# Patient Record
Sex: FEMALE | Race: WHITE | Employment: FULL TIME | URBAN - METROPOLITAN AREA
[De-identification: names, ages, dates, MRNs, and addresses within clinical notes are randomized per-mention and may not be internally consistent; named-entity substitution may affect disease eponyms.]

---

## 2017-09-11 ENCOUNTER — TELEPHONE (OUTPATIENT)
Dept: FAMILY MEDICINE CLINIC | Age: 32
End: 2017-09-11

## 2017-09-11 ENCOUNTER — OFFICE VISIT (OUTPATIENT)
Dept: FAMILY MEDICINE CLINIC | Age: 32
End: 2017-09-11

## 2017-09-11 VITALS
SYSTOLIC BLOOD PRESSURE: 128 MMHG | BODY MASS INDEX: 44.25 KG/M2 | TEMPERATURE: 97.8 F | HEIGHT: 68 IN | DIASTOLIC BLOOD PRESSURE: 88 MMHG | HEART RATE: 83 BPM | OXYGEN SATURATION: 98 % | RESPIRATION RATE: 17 BRPM | WEIGHT: 292 LBS

## 2017-09-11 DIAGNOSIS — F43.21 GRIEF: ICD-10-CM

## 2017-09-11 DIAGNOSIS — Z00.00 ROUTINE GENERAL MEDICAL EXAMINATION AT A HEALTH CARE FACILITY: ICD-10-CM

## 2017-09-11 DIAGNOSIS — F41.9 ANXIETY: ICD-10-CM

## 2017-09-11 DIAGNOSIS — F43.10 PTSD (POST-TRAUMATIC STRESS DISORDER): Primary | ICD-10-CM

## 2017-09-11 DIAGNOSIS — F51.04 PSYCHOPHYSIOLOGICAL INSOMNIA: ICD-10-CM

## 2017-09-11 RX ORDER — LORAZEPAM 0.5 MG/1
0.5 TABLET ORAL
Qty: 14 TAB | Refills: 0 | Status: SHIPPED | OUTPATIENT
Start: 2017-09-11 | End: 2019-10-25 | Stop reason: ALTCHOICE

## 2017-09-11 RX ORDER — TRAZODONE HYDROCHLORIDE 50 MG/1
50 TABLET ORAL
Qty: 30 TAB | Refills: 0 | Status: SHIPPED | OUTPATIENT
Start: 2017-09-11 | End: 2017-09-21 | Stop reason: SDUPTHER

## 2017-09-11 RX ORDER — ESCITALOPRAM OXALATE 10 MG/1
10 TABLET ORAL DAILY
Qty: 30 TAB | Refills: 0 | Status: SHIPPED | OUTPATIENT
Start: 2017-09-11 | End: 2019-10-25 | Stop reason: ALTCHOICE

## 2017-09-11 RX ORDER — LORAZEPAM 0.5 MG/1
0.5 TABLET ORAL
COMMUNITY
Start: 2017-09-09 | End: 2017-09-11 | Stop reason: SDUPTHER

## 2017-09-11 NOTE — MR AVS SNAPSHOT
Visit Information Date & Time Provider Department Dept. Phone Encounter #  
 9/11/2017  1:00 PM Marilyn Woodward, 3 [facility] [phone] [id number] Your Appointments 9/21/2017  2:30 PM  
HOSPITAL FOLLOW-UP with Marilyn Woodward MD  
3 [facility]) Appt Note: hosp f/u per mychart; pt r/s 09/21/17 rb  
 [address] [facility] [address] [facility] [address] [phone]  
  
   
 [address] Upcoming Health Maintenance Date Due DTaP/Tdap/Td series (1 - Tdap) 3/19/2008 INFLUENZA AGE 9 TO ADULT 8/1/2017 PAP AKA CERVICAL CYTOLOGY 1/1/2018 Allergies as of 9/11/2017  Review Complete On: 9/11/2017 By: Marilyn Woodward MD  
  
 Severity Noted Reaction Type Reactions Adhesive  11/25/2015    Rash Arixtra [Fondaparinux]  11/25/2015    Rash Other Medication  11/25/2015    Other (comments) Hormone type medications causes clots Sulfa (Sulfonamide Antibiotics)  11/25/2015    Rash Current Immunizations  Never Reviewed Name Date HPV (Quad) 5/11/2010 12:00 AM, 12/17/2009 12:00 AM, 10/15/2009 12:00 AM  
 Influenza Vaccine 9/22/2015 12:00 AM, 10/15/2012 12:00 AM, 11/3/2011 12:00 AM  
 Tdap 10/15/2009 12:00 AM  
  
 Not reviewed this visit You Were Diagnosed With   
  
 Codes Comments PTSD (post-traumatic stress disorder)    -  Primary ICD-10-CM: F43.10 ICD-9-CM: 309.81 Anxiety     ICD-10-CM: F41.9 ICD-9-CM: 300.00 Grief     ICD-10-CM: R10.11 ICD-9-CM: 309.0 Psychophysiological insomnia     ICD-10-CM: F51.04 
ICD-9-CM: 307.42 Routine general medical examination at a health care facility     ICD-10-CM: Z00.00 ICD-9-CM: V70.0 Vitals BP Pulse Temp Resp Height(growth percentile) Weight(growth percentile) 128/88 (BP 1 Location: Left arm, BP Patient Position: Sitting) 83 97.8 °F (36.6 °C) (Oral) 17 5' 8\" (1.727 m) 292 lb (132.5 kg) LMP SpO2 BMI OB Status Smoking Status 08/28/2017 98% 44.4 kg/m2 Having regular periods Never Smoker Vitals History BMI and BSA Data Body Mass Index Body Surface Area 44.4 kg/m 2 2.52 m 2 Preferred Pharmacy Pharmacy Name Phone [address], [address] Hector Amos Dural [phone] Your Updated Medication List  
  
   
This list is accurate as of: 9/11/17  1:10 PM.  Always use your most recent med list.  
  
  
  
  
 escitalopram oxalate 10 mg tablet Commonly known as:  Verneta Walter Take 1 Tab by mouth daily. LORazepam 0.5 mg tablet Commonly known as:  ATIVAN Take 1 Tab by mouth daily as needed. traZODone 50 mg tablet Commonly known as:  Donzella Eaton Take 1 Tab by mouth nightly as needed for Sleep. Prescriptions Printed Refills LORazepam (ATIVAN) 0.5 mg tablet 0 Sig: Take 1 Tab by mouth daily as needed. Class: Print Route: Oral  
  
Prescriptions Sent to Pharmacy Refills  
 traZODone (DESYREL) 50 mg tablet 0 Sig: Take 1 Tab by mouth nightly as needed for Sleep. Class: Normal  
 Pharmacy: [place] [address] Ph #: [phone] Route: Oral  
 escitalopram oxalate (LEXAPRO) 10 mg tablet 0 Sig: Take 1 Tab by mouth daily. Class: Normal  
 Pharmacy: [place] [address] Ph #: [phone] Route: Oral  
  
To-Do List   
 09/11/2017 Lab:  CBC W/O DIFF   
  
 09/11/2017 Lab:  LIPID PANEL   
  
 09/11/2017 Lab:  METABOLIC PANEL, COMPREHENSIVE   
  
 09/11/2017 Lab:  TSH 3RD GENERATION Patient Instructions Post-Traumatic Stress Disorder (PTSD): Care Instructions Your Care Instructions Post-traumatic stress disorder (PTSD) is a mental condition that can result from being in or seeing a traumatic or terrifying event.  These events can include combat, a terrorist attack, a natural disaster, a serious accident, an assault, or a rape. If you have PTSD, you may often relive the experience in nightmares or flashbacks. These are clear and frightening memories of the event. You may also have trouble sleeping. PTSD affects people in very different ways. It can interfere with daily activities such as work or school, and it can make you withdraw from friends or loved ones. Follow-up care is a key part of your treatment and safety. Be sure to make and go to all appointments, and call your doctor if you are having problems. It's also a good idea to know your test results and keep a list of the medicines you take. How can you care for yourself at home? · Take medicines exactly as directed. Call your doctor if you think you are having a problem with your medicine. · Go to your counseling sessions and follow-up appointments. · Recognize and accept your anxiety. Then, when you are in a situation that makes you anxious, say to yourself, \"This is not an emergency. I feel uncomfortable, but I am not in danger. I can keep going even if I feel anxious. \" · Be kind to your body: ¨ Relieve tension with exercise or a massage. ¨ Get enough rest. 
¨ Avoid alcohol, caffeine, nicotine, and illegal drugs. They can increase your anxiety level and cause sleep problems. ¨ Learn and do relaxation techniques. See below for more about these techniques. · Engage your mind. Get out and do something you enjoy. Go to a funny movie, or take a walk or hike. Plan your day. Having too much or too little to do can make you anxious. · Keep a record of your symptoms. Discuss your fears with a good friend or family member, or join a support group for people with similar problems. Talking to others sometimes relieves stress. · Get involved in social groups, or volunteer to help others. Being alone sometimes makes things seem worse than they are. · Get at least 30 minutes of exercise on most days of the week. Walking is a good choice. You also may want to do other activities, such as running, swimming, cycling, or playing tennis or team sports. · Keep the numbers for these national suicide hotlines: 3-182-792-TALK (5-673.635.4770) and 2-431-MIQMGVM (6-696.314.3879). If you or someone you know talks about suicide or feeling hopeless, get help right away. Relaxation techniques Do relaxation exercises 10 to 20 minutes a day. You can play soothing, relaxing music while you do them, if you wish. · Tell others in your house that you are going to do your relaxation exercises. Ask them not to disturb you. · Find a comfortable place, away from all distractions and noise. · Lie down on your back, or sit with your back straight. · Focus on your breathing. Make it slow and steady. · Breathe in through your nose. Breathe out through either your nose or mouth. · Breathe deeply, filling up the area between your navel and your rib cage. Breathe so that your belly goes up and down. · Do not hold your breath. · Breathe like this for 5 to 10 minutes. Notice the feeling of calmness throughout your whole body. As you continue to breathe slowly and deeply, relax by doing the following for another 5 to 10 minutes: · Tighten and relax each muscle group in your body. You can begin at your toes and work your way up to your head. · Imagine your muscle groups relaxing and becoming heavy. · Empty your mind of all thoughts. · Let yourself relax more and more deeply. · Become aware of the state of calmness that surrounds you. · When your relaxation time is over, you can bring yourself back to alertness by moving your fingers and toes and then your hands and feet and then stretching and moving your entire body. Sometimes people fall asleep during relaxation, but they usually wake up shortly afterward. · Always give yourself time to return to full alertness before you drive a car or do anything that might cause an accident if you are not fully alert. Never play a relaxation tape while you drive a car. When should you call for help? Call 911 anytime you think you may need emergency care. For example, call if: 
· You feel you cannot stop from hurting yourself or someone else. Watch closely for changes in your health, and be sure to contact your doctor if: 
· Your PTSD symptoms are getting worse. · You have new or worsening symptoms of anxiety. · You are not getting better as expected. Where can you learn more? Go to http://elly-geoff.info/. Sanchez Pineda in the search box to learn more about \"Post-Traumatic Stress Disorder (PTSD): Care Instructions. \" Current as of: July 26, 2016 Content Version: 11.3 © 0758-2755 [employer]. Care instructions adapted under license by [employer] (which disclaims liability or warranty for this information). If you have questions about a medical condition or this instruction, always ask your healthcare professional. [place] [address] any warranty or liability for your use of this information. Make an appointment with:  
 
[facility] [address] Juanita Ashley Cohen 1945 Psychotherapy Reyessmykel Magalalnes, [address] [place] [address] 
[phone] [facility]! Dear Oren Reece: 
Thank you for requesting a [employer] account. Our records indicate that you already have an active Ne[employer] account. You can access your account anytime at [email or web address] Did you know that you can access your hospital and ER discharge instructions at any time in Neutral Space? You can also review all of your test results from your hospital stay or ER visit. Additional Information If you have questions, please visit the Frequently Asked Questions section of the [employer] website at [email or web address] Remember, [employer] is NOT to be used for urgent needs. For medical emergencies, dial 911. Now available from your iPhone and Android! Please provide this summary of care documentation to your next provider. Your primary care clinician is listed as Betty Clarke. If you have any questions after today's visit, please call [phone].
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
## 2017-09-11 NOTE — PATIENT INSTRUCTIONS
Post-Traumatic Stress Disorder (PTSD): Care Instructions  Your Care Instructions  Post-traumatic stress disorder (PTSD) is a mental condition that can result from being in or seeing a traumatic or terrifying event. These events can include combat, a terrorist attack, a natural disaster, a serious accident, an assault, or a rape. If you have PTSD, you may often relive the experience in nightmares or flashbacks. These are clear and frightening memories of the event. You may also have trouble sleeping. PTSD affects people in very different ways. It can interfere with daily activities such as work or school, and it can make you withdraw from friends or loved ones. Follow-up care is a key part of your treatment and safety. Be sure to make and go to all appointments, and call your doctor if you are having problems. It's also a good idea to know your test results and keep a list of the medicines you take. How can you care for yourself at home? · Take medicines exactly as directed. Call your doctor if you think you are having a problem with your medicine. · Go to your counseling sessions and follow-up appointments. · Recognize and accept your anxiety. Then, when you are in a situation that makes you anxious, say to yourself, \"This is not an emergency. I feel uncomfortable, but I am not in danger. I can keep going even if I feel anxious. \"  · Be kind to your body:  ¨ Relieve tension with exercise or a massage. ¨ Get enough rest.  ¨ Avoid alcohol, caffeine, nicotine, and illegal drugs. They can increase your anxiety level and cause sleep problems. ¨ Learn and do relaxation techniques. See below for more about these techniques. · Engage your mind. Get out and do something you enjoy. Go to a funny movie, or take a walk or hike. Plan your day. Having too much or too little to do can make you anxious. · Keep a record of your symptoms.  Discuss your fears with a good friend or family member, or join a support group for people with similar problems. Talking to others sometimes relieves stress. · Get involved in social groups, or volunteer to help others. Being alone sometimes makes things seem worse than they are. · Get at least 30 minutes of exercise on most days of the week. Walking is a good choice. You also may want to do other activities, such as running, swimming, cycling, or playing tennis or team sports. · Keep the numbers for these national suicide hotlines: 1-806-185-TALK (7-594.597.8918) and 1-807-EPOKOBP (4-417.560.7292). If you or someone you know talks about suicide or feeling hopeless, get help right away. Relaxation techniques  Do relaxation exercises 10 to 20 minutes a day. You can play soothing, relaxing music while you do them, if you wish. · Tell others in your house that you are going to do your relaxation exercises. Ask them not to disturb you. · Find a comfortable place, away from all distractions and noise. · Lie down on your back, or sit with your back straight. · Focus on your breathing. Make it slow and steady. · Breathe in through your nose. Breathe out through either your nose or mouth. · Breathe deeply, filling up the area between your navel and your rib cage. Breathe so that your belly goes up and down. · Do not hold your breath. · Breathe like this for 5 to 10 minutes. Notice the feeling of calmness throughout your whole body. As you continue to breathe slowly and deeply, relax by doing the following for another 5 to 10 minutes:  · Tighten and relax each muscle group in your body. You can begin at your toes and work your way up to your head. · Imagine your muscle groups relaxing and becoming heavy. · Empty your mind of all thoughts. · Let yourself relax more and more deeply. · Become aware of the state of calmness that surrounds you.   · When your relaxation time is over, you can bring yourself back to alertness by moving your fingers and toes and then your hands and feet and then stretching and moving your entire body. Sometimes people fall asleep during relaxation, but they usually wake up shortly afterward. · Always give yourself time to return to full alertness before you drive a car or do anything that might cause an accident if you are not fully alert. Never play a relaxation tape while you drive a car. When should you call for help? Call 911 anytime you think you may need emergency care. For example, call if:  · You feel you cannot stop from hurting yourself or someone else. Watch closely for changes in your health, and be sure to contact your doctor if:  · Your PTSD symptoms are getting worse. · You have new or worsening symptoms of anxiety. · You are not getting better as expected. Where can you learn more? Go to http://elly-geoff.info/. Ryan Monroy in the search box to learn more about \"Post-Traumatic Stress Disorder (PTSD): Care Instructions. \"  Current as of: July 26, 2016  Content Version: 11.3  © 5360-3856 [employer]. Care instructions adapted under license by [employer] (which disclaims liability or warranty for this information). If you have questions about a medical condition or this instruction, always ask your healthcare professional. [place] [address] any warranty or liability for your use of this information. Make an appointment with:     [facility]  [address] [facility] [address] [facility]  [address] [facility], [address] [place] [address]  [phone]
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
## 2017-09-21 ENCOUNTER — OFFICE VISIT (OUTPATIENT)
Dept: FAMILY MEDICINE CLINIC | Age: 32
End: 2017-09-21

## 2017-09-21 ENCOUNTER — HOSPITAL ENCOUNTER (OUTPATIENT)
Dept: LAB | Age: 32
Discharge: HOME | End: 2017-09-21
Payer: COMMERCIAL

## 2017-09-21 VITALS
RESPIRATION RATE: 20 BRPM | WEIGHT: 293 LBS | DIASTOLIC BLOOD PRESSURE: 76 MMHG | HEART RATE: 100 BPM | TEMPERATURE: 98.2 F | HEIGHT: 68 IN | OXYGEN SATURATION: 97 % | BODY MASS INDEX: 44.41 KG/M2 | SYSTOLIC BLOOD PRESSURE: 118 MMHG

## 2017-09-21 DIAGNOSIS — F51.04 PSYCHOPHYSIOLOGICAL INSOMNIA: ICD-10-CM

## 2017-09-21 DIAGNOSIS — Z01.419 WELL FEMALE EXAM WITH ROUTINE GYNECOLOGICAL EXAM: ICD-10-CM

## 2017-09-21 DIAGNOSIS — Z23 ENCOUNTER FOR IMMUNIZATION: ICD-10-CM

## 2017-09-21 DIAGNOSIS — F43.10 PTSD (POST-TRAUMATIC STRESS DISORDER): ICD-10-CM

## 2017-09-21 DIAGNOSIS — F32.A DEPRESSION, UNSPECIFIED DEPRESSION TYPE: Primary | ICD-10-CM

## 2017-09-21 PROCEDURE — 88175 CYTOPATH C/V AUTO FLUID REDO: CPT | Performed by: INTERNAL MEDICINE

## 2017-09-21 RX ORDER — TRAZODONE HYDROCHLORIDE 100 MG/1
100 TABLET ORAL
Qty: 90 TAB | Refills: 0 | Status: SHIPPED | OUTPATIENT
Start: 2017-09-21 | End: 2019-10-25 | Stop reason: ALTCHOICE
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
## 2017-09-21 NOTE — PROGRESS NOTES
Assessment/Plan:    1. Depression, unspecified depression type  -cont lexapro. Psych to take over management next month. 2. Psychophysiological insomnia  - traZODone (DESYREL) 100 mg tablet; Take 1 Tab by mouth nightly as needed for Sleep. Dispense: 90 Tab; Refill: 0    3. PTSD (post-traumatic stress disorder)  -cont counseling    4. Encounter for immunization  - Influenza virus vaccine (QUADRIVALENT PRES FREE SYRINGE) IM ([id number])  - [place] IMMUNIZ ADMIN,1 SINGLE/COMB VAC/TOXOID    5. Well female exam with routine gynecological exam  - PAP IG, RFX APTIMA HPV ASCUS ([id number])); Future    The plan was discussed with the patient. The patient verbalized understanding and is in agreement with the plan. All medication potential side effects were discussed with the patient. Health Maintenance:   Health Maintenance   Topic Date Due    INFLUENZA AGE 9 TO ADULT  08/01/2017    PAP AKA CERVICAL CYTOLOGY  01/01/2018    DTaP/Tdap/Td series (2 - Td) 10/15/2019       Larissa Katz is a 29 y.o. female and presents with Hospital Follow Up (medical shock ) and Medication Evaluation     Subjective:  She has seen crisis counselor a few times. Has also seen a counselor, they plan on taking over her meds. I had started her on lexapro. She is tolerating it well, but doesn't know if it's helping. She has had to return to work b/c of fear of losing her job. She has been taking the ativan more b/c of this. Has appt with counselor on 10/11 (who is now prescribing the ativan). ROS:  Constitutional: No recent weight change. No weakness/fatigue. No f/c. Skin: No rashes, change in nails/hair, itching   HENT: No HA, dizziness. No hearing loss/tinnitus. No nasal congestion/discharge. Eyes: No change in vision, double/blurred vision or eye pain/redness. Cardiovascular: No CP/palpitations. No GLEASON/orthopnea/PND. Respiratory: No cough/sputum, dyspnea, wheezing. Gastointestinal: No dysphagia, reflux. No n/v.   No constipation/diarrhea. No melena/rectal bleeding. Genitourinary: No dysuria, urinary hesitancy, nocturia, hematuria. No incontinence. Musculoskeletal: No joint pain/stiffness. No muscle pain/tenderness. Endo: No heat/cold intolerance, no polyuria/polydypsia. Heme: No h/o anemia. No easy bleeding/bruising. Allergy/Immunology: No seasonal rhinitis. Denies frequent colds, sinus/ear infections. Neurological: No seizures/numbness/weakness. No paresthesias. Psychiatric:  No depression, anxiety. The problem list was updated as a part of today's visit. Patient Active Problem List   Diagnosis Code    Depression F32.9    History of pulmonary embolism Z86.711    May-Thurner syndrome I87.1    Cholelithiases K80.20    Nephrolithiasis N20.0       The PSH, FH were reviewed. SH:  Social History   Substance Use Topics    Smoking status: Never Smoker    Smokeless tobacco: Never Used    Alcohol use 0.0 - 0.6 oz/week     0 - 1 Glasses of wine per week       Medications/Allergies:  Current Outpatient Prescriptions on File Prior to Visit   Medication Sig Dispense Refill    traZODone (DESYREL) 50 mg tablet Take 1 Tab by mouth nightly as needed for Sleep. 30 Tab 0    escitalopram oxalate (LEXAPRO) 10 mg tablet Take 1 Tab by mouth daily. 30 Tab 0    LORazepam (ATIVAN) 0.5 mg tablet Take 1 Tab by mouth daily as needed. 14 Tab 0     No current facility-administered medications on file prior to visit.          Allergies   Allergen Reactions    Adhesive Rash    Arixtra [Fondaparinux] Rash    Other Medication Other (comments)     Hormone type medications causes clots    Sulfa (Sulfonamide Antibiotics) Rash       Objective:  Visit Vitals    /76 (BP 1 Location: Left arm, BP Patient Position: Sitting)    Pulse 100    Temp 98.2 °F (36.8 °C) (Oral)    Resp 20    Ht 5' 8\" (1.727 m)    Wt 295 lb 6.4 oz (134 kg)    LMP 08/28/2017    SpO2 97%    BMI 44.92 kg/m2      Constitutional: Well developed, nourished, no distress, alert, obese habitus   CV: S1, S2.  RRR. No murmurs/rubs. No thrills palpated. No carotid bruits. Intact distal pulses. No edema. Pulm: No abnormalities on inspection. Clear to auscultation bilaterally. No wheezing/rhonchi. Normal effort. GI: Soft, nontender, nondistended. Normal active bowel sounds. Psych: Appropriate affect, judgement and insight. Short-term memory intact. Pelvic exam: normal external genitalia, vulva, vagina, cervix, uterus and adnexa.

## 2017-09-21 NOTE — MR AVS SNAPSHOT
Visit Information Date & Time Provider Department Dept. Phone Encounter #  
 9/21/2017  2:30 PM Paula Owens, 3 [facility] [phone] [id number] Upcoming Health Maintenance Date Due INFLUENZA AGE 9 TO ADULT 8/1/2017 PAP AKA CERVICAL CYTOLOGY 1/1/2018 DTaP/Tdap/Td series (2 - Td) 10/15/2019 Allergies as of 9/21/2017  Review Complete On: 9/21/2017 By: Paula Owens MD  
  
 Severity Noted Reaction Type Reactions Adhesive  11/25/2015    Rash Arixtra [Fondaparinux]  11/25/2015    Rash Other Medication  11/25/2015    Other (comments) Hormone type medications causes clots Sulfa (Sulfonamide Antibiotics)  11/25/2015    Rash Current Immunizations  Never Reviewed Name Date HPV (Quad) 5/11/2010 12:00 AM, 12/17/2009 12:00 AM, 10/15/2009 12:00 AM  
 Influenza Vaccine 9/22/2015 12:00 AM, 10/15/2012 12:00 AM, 11/3/2011 12:00 AM  
 Influenza Vaccine (Quad) PF  Incomplete Tdap 10/15/2009 12:00 AM  
  
 Not reviewed this visit You Were Diagnosed With   
  
 Codes Comments Depression, unspecified depression type    -  Primary ICD-10-CM: F32.9 ICD-9-CM: 444 Psychophysiological insomnia     ICD-10-CM: F51.04 
ICD-9-CM: 307.42 PTSD (post-traumatic stress disorder)     ICD-10-CM: F43.10 ICD-9-CM: 309.81 Encounter for immunization     ICD-10-CM: R61 ICD-9-CM: V03.89 Well female exam with routine gynecological exam     ICD-10-CM: L34.467 ICD-9-CM: V72.31 Vitals BP Pulse Temp Resp Height(growth percentile) Weight(growth percentile) 118/76 (BP 1 Location: Left arm, BP Patient Position: Sitting) 100 98.2 °F (36.8 °C) (Oral) 20 5' 8\" (1.727 m) 295 lb 6.4 oz (134 kg) LMP SpO2 BMI OB Status Smoking Status 08/28/2017 97% 44.92 kg/m2 Having regular periods Never Smoker BMI and BSA Data Body Mass Index Body Surface Area 44.92 kg/m 2 2.54 m 2 Preferred Pharmacy Pharmacy Name Phone [address], [address] [place] [address]. [address] [place] [address] [phone] Your Updated Medication List  
  
   
This list is accurate as of: 9/21/17  2:42 PM.  Always use your most recent med list.  
  
  
  
  
 escitalopram oxalate 10 mg tablet Commonly known as:  Louvella Milo Take 1 Tab by mouth daily. LORazepam 0.5 mg tablet Commonly known as:  ATIVAN Take 1 Tab by mouth daily as needed. traZODone 100 mg tablet Commonly known as:  Illene Dus Take 1 Tab by mouth nightly as needed for Sleep. Prescriptions Sent to Pharmacy Refills  
 traZODone (DESYREL) 100 mg tablet 0 Sig: Take 1 Tab by mouth nightly as needed for Sleep. Class: Normal  
 Pharmacy: [place] [id number] Ph #: [phone] Route: Oral  
  
We Performed the Following INFLUENZA VIRUS VAC QUAD,SPLIT,PRESV FREE SYRINGE IM [id number] CPT(R)] AL IMMUNIZ ADMIN,1 SINGLE/COMB VAC/TOXOID [id number] CPT(R)] To-Do List   
 09/21/2017 Pathology:  PAP IG, RFX APTIMA HPV ASCUS ([id number])) Introducing [facility] & HEALTH SERVICES! Dear Ming Perry: 
Thank you for requesting a [employer] account. Our records indicate that you already have an active [employer] account. You can access your account anytime at [email or web address] Did you know that you can access your hospital and ER discharge instructions at any time in [employer]? You can also review all of your test results from your hospital stay or ER visit. Additional Information If you have questions, please visit the Frequently Asked Questions section of the [employer] website at [email or web address]. Remember, [employer] is NOT to be used for urgent needs. For medical emergencies, dial 911. Now available from your iPhone and Android! Please provide this summary of care documentation to your next provider. Your primary care clinician is listed as Betty Clarke. If you have any questions after today's visit, please call [phone].

## 2017-09-22 RX ORDER — METRONIDAZOLE 500 MG/1
500 TABLET ORAL 2 TIMES DAILY
Qty: 14 TAB | Refills: 0 | Status: SHIPPED | OUTPATIENT
Start: 2017-09-22 | End: 2017-09-29

## 2017-09-29 ENCOUNTER — TELEPHONE (OUTPATIENT)
Dept: FAMILY MEDICINE CLINIC | Age: 32
End: 2017-09-29

## 2017-09-29 NOTE — TELEPHONE ENCOUNTER
Pt called with left leg and ankle swelling. She has hx of 2 DVT leg left. Was on Xarelto previously. Pt states this came on suddenly, spreading to ankle. She is currently elevating leg. Pt is asking if she should go to ER. Advised pt to go to ER for stat labs and PVL.

## 2017-10-12 ENCOUNTER — HOSPITAL ENCOUNTER (OUTPATIENT)
Dept: LAB | Age: 32
Discharge: HOME | End: 2017-10-12
Payer: COMMERCIAL

## 2017-10-12 ENCOUNTER — OFFICE VISIT (OUTPATIENT)
Dept: FAMILY MEDICINE CLINIC | Age: 32
End: 2017-10-12

## 2017-10-12 VITALS
OXYGEN SATURATION: 95 % | WEIGHT: 293 LBS | BODY MASS INDEX: 44.41 KG/M2 | TEMPERATURE: 97.6 F | HEIGHT: 68 IN | DIASTOLIC BLOOD PRESSURE: 90 MMHG | HEART RATE: 89 BPM | SYSTOLIC BLOOD PRESSURE: 120 MMHG | RESPIRATION RATE: 16 BRPM

## 2017-10-12 DIAGNOSIS — I87.1 MAY-THURNER SYNDROME: ICD-10-CM

## 2017-10-12 DIAGNOSIS — I45.10 RBBB: ICD-10-CM

## 2017-10-12 DIAGNOSIS — R60.0 LEG EDEMA, LEFT: Primary | ICD-10-CM

## 2017-10-12 DIAGNOSIS — R06.09 DOE (DYSPNEA ON EXERTION): ICD-10-CM

## 2017-10-12 PROBLEM — I87.2 VENOUS REFLUX: Status: ACTIVE | Noted: 2017-10-12

## 2017-10-12 LAB
ALBUMIN SERPL-MCNC: 3.6 G/DL (ref 3.4–5)
ALBUMIN/GLOB SERPL: 1 {RATIO} (ref 0.8–1.7)
ALP SERPL-CCNC: 73 U/L (ref 45–117)
ALT SERPL-CCNC: 23 U/L (ref 13–56)
ANION GAP SERPL CALC-SCNC: 9 MMOL/L (ref 3–18)
AST SERPL-CCNC: 16 U/L (ref 15–37)
BILIRUB SERPL-MCNC: 0.5 MG/DL (ref 0.2–1)
BNP SERPL-MCNC: 35 PG/ML (ref 0–450)
BUN SERPL-MCNC: 9 MG/DL (ref 7–18)
BUN/CREAT SERPL: 12 (ref 12–20)
CALCIUM SERPL-MCNC: 8.5 MG/DL (ref 8.5–10.1)
CHLORIDE SERPL-SCNC: 106 MMOL/L (ref 100–108)
CO2 SERPL-SCNC: 26 MMOL/L (ref 21–32)
CREAT SERPL-MCNC: 0.75 MG/DL (ref 0.6–1.3)
D DIMER PPP FEU-MCNC: 2.68 UG/ML(FEU)
GLOBULIN SER CALC-MCNC: 3.6 G/DL (ref 2–4)
GLUCOSE SERPL-MCNC: 90 MG/DL (ref 74–99)
POTASSIUM SERPL-SCNC: 4.4 MMOL/L (ref 3.5–5.5)
PROT SERPL-MCNC: 7.2 G/DL (ref 6.4–8.2)
SODIUM SERPL-SCNC: 141 MMOL/L (ref 136–145)

## 2017-10-12 PROCEDURE — 83880 ASSAY OF NATRIURETIC PEPTIDE: CPT | Performed by: INTERNAL MEDICINE

## 2017-10-12 PROCEDURE — 36415 COLL VENOUS BLD VENIPUNCTURE: CPT | Performed by: INTERNAL MEDICINE

## 2017-10-12 PROCEDURE — 85379 FIBRIN DEGRADATION QUANT: CPT | Performed by: INTERNAL MEDICINE

## 2017-10-12 PROCEDURE — 80053 COMPREHEN METABOLIC PANEL: CPT | Performed by: INTERNAL MEDICINE

## 2017-10-12 RX ORDER — BISMUTH SUBSALICYLATE 262 MG
1 TABLET,CHEWABLE ORAL DAILY
COMMUNITY
End: 2019-10-25 | Stop reason: ALTCHOICE

## 2017-10-12 NOTE — PROGRESS NOTES
Assessment/Plan:    1. Leg edema, left- in setting of L common iliac stent, would like to r/o occlusion of stent and will refer to vascular. PVL 9/29/17 showed NO venous reflux on LLE. However 2014 PVL did show significant reflux. Referral to lymphedema clinic for compression stocking/lymphedema massage.  - multivitamin (ONE A DAY) tablet; Take 1 Tab by mouth daily.  - REFERRAL TO [address] [place] [address]    2. May-Thurner syndrome  - multivitamin (ONE A DAY) tablet; Take 1 Tab by mouth daily.  - REFERRAL TO [address] [place] [address]    3. GLEASON (dyspnea on exertion)  -ck labs. EKG ok, unchanged from prior. Ck echo. - METABOLIC PANEL, COMPREHENSIVE; Future  - D DIMER; Future  - AMB POC EKG ROUTINE W/ 12 LEADS, INTER & REP  - NT-PRO BNP; Future  - 2D ECHO COMPLETE ADULT (TTE) W OR WO CONTR; Future    4. RBBB  - 2D ECHO COMPLETE ADULT (TTE) W OR WO CONTR; Future    The plan was discussed with the patient. The patient verbalized understanding and is in agreement with the plan. All medication potential side effects were discussed with the patient. Health Maintenance:   Health Maintenance   Topic Date Due    DTaP/Tdap/Td series (2 - Td) 10/15/2019    PAP AKA CERVICAL CYTOLOGY  09/21/2020    INFLUENZA AGE 9 TO ADULT  Completed       Luther Kemp is a 29 y.o. female and presents with Leg Pain (started 9/25/ ) and Leg Swelling     Subjective:  Pt recently seen in ER 9/29 for R leg and foot pain, erythema and swelling x 2 weeks. PVL did not show DVT. It did show RLE only venous reflux. PVL from 2014 did show venous reflux on LLE. She has L common iliac stent (placed by IR). Started having GLEASON on 10/7- states she noticed it while walking up the stairs. She states it feels different than \"being out of shape shortness of breath\". She also felt her HR was faster than normal with activity. ROS:  Constitutional: No recent weight change. No weakness/fatigue.   No f/c.   Cardiovascular: No CP/palpitations. No GLEASON/orthopnea/PND. Respiratory: No cough/sputum, dyspnea, wheezing. Gastointestinal: No dysphagia, reflux. No n/v. No constipation/diarrhea. No melena/rectal bleeding. The problem list was updated as a part of today's visit. Patient Active Problem List   Diagnosis Code    Depression F32.9    History of pulmonary embolism Z86.711    May-Thurner syndrome I87.1    Cholelithiases K80.20    Nephrolithiasis N20.0       The PSH,  were reviewed. SH:  Social History   Substance Use Topics    Smoking status: Never Smoker    Smokeless tobacco: Never Used    Alcohol use 0.0 - 0.6 oz/week     0 - 1 Glasses of wine per week       Medications/Allergies:  Current Outpatient Prescriptions on File Prior to Visit   Medication Sig Dispense Refill    traZODone (DESYREL) 100 mg tablet Take 1 Tab by mouth nightly as needed for Sleep. 90 Tab 0    escitalopram oxalate (LEXAPRO) 10 mg tablet Take 1 Tab by mouth daily. (Patient taking differently: Take 20 mg by mouth daily.) 30 Tab 0    LORazepam (ATIVAN) 0.5 mg tablet Take 1 Tab by mouth daily as needed. 14 Tab 0     No current facility-administered medications on file prior to visit. Allergies   Allergen Reactions    Adhesive Rash    Arixtra [Fondaparinux] Rash    Other Medication Other (comments)     Hormone type medications causes clots    Sulfa (Sulfonamide Antibiotics) Rash       Objective:  Visit Vitals    /90 (BP 1 Location: Left arm, BP Patient Position: Sitting)    Pulse 89    Temp 97.6 °F (36.4 °C) (Oral)    Resp 16    Ht 5' 8\" (1.727 m)    Wt 296 lb (134.3 kg)    LMP 09/25/2017    SpO2 95%    BMI 45.01 kg/m2      Constitutional: Well developed, nourished, no distress, alert, obese habitus   HENT: Exterior ears and tympanic membranes normal bilaterally. Supple neck. No thyromegaly or lymphadenopathy. Oropharynx clear and moist mucous membranes. Eyes: Conjunctiva normal. PERRL. CV: S1, S2.  RRR. No murmurs/rubs. No thrills palpated. No carotid bruits. Intact distal pulses. 1+ RLE pitting edema. Mild erythema of LLE. Pulm: No abnormalities on inspection. Clear to auscultation bilaterally. No wheezing/rhonchi. Normal effort.        EKG: RBBB, no ST changes, NSR.

## 2017-10-12 NOTE — PROGRESS NOTES
Jay Vanessa is a 29 y.o. female (: 1985) presenting to address:    Chief Complaint   Patient presents with    Leg Pain     started     Leg Swelling    Palpitations     started around 10/7     Breathing Problem       Vitals:    10/12/17 0734   BP: 120/90   Pulse: 89   Resp: 16   Temp: 97.6 °F (36.4 °C)   TempSrc: Oral   SpO2: 95%   Weight: 296 lb (134.3 kg)   Height: 5' 8\" (1.727 m)   LMP: 2017       Hearing/Vision:   No exam data present    Learning Assessment:     Learning Assessment 2015   PRIMARY LEARNER Patient   HIGHEST LEVEL OF EDUCATION - PRIMARY LEARNER  SOME COLLEGE   BARRIERS PRIMARY LEARNER NONE   CO-LEARNER CAREGIVER No   PRIMARY LANGUAGE ENGLISH   LEARNER PREFERENCE PRIMARY OTHER (COMMENT)   ANSWERED BY Patient   RELATIONSHIP SELF     Depression Screening:     PHQ over the last two weeks 2015   Little interest or pleasure in doing things Several days   Feeling down, depressed or hopeless Nearly every day   Total Score PHQ 2 4   Trouble falling or staying asleep, or sleeping too much Nearly every day   Feeling tired or having little energy Nearly every day   Poor appetite or overeating Nearly every day   Feeling bad about yourself - or that you are a failure or have let yourself or your family down More than half the days   Trouble concentrating on things such as school, work, reading or watching TV Nearly every day   Moving or speaking so slowly that other people could have noticed; or the opposite being so fidgety that others notice Nearly every day   Thoughts of being better off dead, or hurting yourself in some way Nearly every day   PHQ 9 Score 24   How difficult have these problems made it for you to do your work, take care of your home and get along with others Very difficult     Fall Risk Assessment:     Fall Risk Assessment, last 12 mths 10/12/2017   Able to walk? Yes   Fall in past 12 months?  No     Abuse Screening:     Abuse Screening Questionnaire 10/12/2017   Do you ever feel afraid of your partner? N   Are you in a relationship with someone who physically or mentally threatens you? N   Is it safe for you to go home? Y     Coordination of Care Questionaire:   1. Have you been to the ER, urgent care clinic since your last visit? Hospitalized since your last visit? YES Er last month     2. Have you seen or consulted any other health care providers outside of the [address] [place] [address] since your last visit? Include any pap smears or colon screening. NO    Advanced Directive:   1. Do you have an Advanced Directive? NO    2. Would you like information on Advanced Directives?  NO

## 2017-10-12 NOTE — MR AVS SNAPSHOT
Visit Information Date & Time Provider Department Dept. Phone Encounter #  
 10/12/2017  7:30 AM Noe Burkett, [facility] [phone] [id number] Upcoming Health Maintenance Date Due DTaP/Tdap/Td series (2 - Td) 10/15/2019 PAP AKA CERVICAL CYTOLOGY 9/21/2020 Allergies as of 10/12/2017  Review Complete On: 10/12/2017 By: Yue Cevallos LPN Severity Noted Reaction Type Reactions Adhesive  11/25/2015    Rash Arixtra [Fondaparinux]  11/25/2015    Rash Other Medication  11/25/2015    Other (comments) Hormone type medications causes clots Sulfa (Sulfonamide Antibiotics)  11/25/2015    Rash Current Immunizations  Never Reviewed Name Date HPV (Quad) 5/11/2010 12:00 AM, 12/17/2009 12:00 AM, 10/15/2009 12:00 AM  
 Influenza Vaccine 9/22/2015 12:00 AM, 10/15/2012 12:00 AM, 11/3/2011 12:00 AM  
 Influenza Vaccine (Quad) PF 9/21/2017  2:54 PM  
 Tdap 10/15/2009 12:00 AM  
  
 Not reviewed this visit You Were Diagnosed With   
  
 Codes Comments Leg edema, left    -  Primary ICD-10-CM: R60.0 ICD-9-CM: 782.3 May-Thurner syndrome     ICD-10-CM: I87.1 ICD-9-CM: 459.2 GLEASON (dyspnea on exertion)     ICD-10-CM: R06.09 
ICD-9-CM: 786.09   
 RBBB     ICD-10-CM: I45.10 ICD-9-CM: 426.4 Vitals BP Pulse Temp Resp Height(growth percentile) Weight(growth percentile) 120/90 (BP 1 Location: Left arm, BP Patient Position: Sitting) 89 97.6 °F (36.4 °C) (Oral) 16 5' 8\" (1.727 m) 296 lb (134.3 kg) LMP SpO2 BMI OB Status Smoking Status 09/25/2017 95% 45.01 kg/m2 Having regular periods Never Smoker Vitals History BMI and BSA Data Body Mass Index Body Surface Area 45.01 kg/m 2 2.54 m 2 Preferred Pharmacy Pharmacy Name Phone [facility], [address] [place] [address] Deepa Ashley [phone] Your Updated Medication List  
  
   
 This list is accurate as of: 10/12/17  8:10 AM.  Always use your most recent med list.  
  
  
  
  
 escitalopram oxalate 10 mg tablet Commonly known as:  Elpidio Barban Take 1 Tab by mouth daily. LORazepam 0.5 mg tablet Commonly known as:  ATIVAN Take 1 Tab by mouth daily as needed. multivitamin tablet Commonly known as:  ONE A DAY Take 1 Tab by mouth daily. traZODone 100 mg tablet Commonly known as:  Saint Rumeleni Take 1 Tab by mouth nightly as needed for Sleep. We Performed the Following AMB POC EKG ROUTINE W/ 12 LEADS, INTER & REP [14495 CPT(R)] REFERRAL TO LYMPHEDEMA CLINIC [SLK277 Custom] REFERRAL TO VASCULAR CENTER [IAK153 Custom] To-Do List   
 Around 10/12/2017 ECHO:  2D ECHO COMPLETE ADULT (TTE) W OR WO CONTR   
  
 10/12/2017 Lab:  D DIMER   
  
 10/12/2017 Lab:  METABOLIC PANEL, COMPREHENSIVE   
  
 10/12/2017 Lab:  NT-PRO BNP Referral Information Referral ID Referred By Referred To  
  
 [id number] Kelsie Walton V Not Available Visits Status Start Date End Date 1 New Request 10/12/17 10/12/18 If your referral has a status of pending review or denied, additional information will be sent to support the outcome of this decision. Referral ID Referred By Referred To  
 [id number] [facility], Mayco Tripp MD  
   [address] Noelle Conway [address] Phone: [phone] Fax: [phone] Visits Status Start Date End Date 1 New Request 10/12/17 10/12/18 If your referral has a status of pending review or denied, additional information will be sent to support the outcome of this decision. Introducing [facility] & HEALTH SERVICES! Dear Faraz Heart: 
Thank you for requesting a [employer] account. Our records indicate that you already have an active [employer] account. You can access your account anytime at [email or web address] Did you know that you can access your hospital and ER discharge instructions at any time in [employer]? You can also review all of your test results from your hospital stay or ER visit. Additional Information If you have questions, please visit the Frequently Asked Questions section of the [employer] website at [email or web address] Remember, [employer] is NOT to be used for urgent needs. For medical emergencies, dial 911. Now available from your iPhone and Android! Please provide this summary of care documentation to your next provider. Your primary care clinician is listed as Betty Clarke. If you have any questions after today's visit, please call [phone].

## 2017-11-13 DIAGNOSIS — R06.09 DOE (DYSPNEA ON EXERTION): ICD-10-CM

## 2017-11-13 DIAGNOSIS — I45.10 RBBB: ICD-10-CM

## 2019-10-25 ENCOUNTER — OFFICE VISIT (OUTPATIENT)
Dept: FAMILY MEDICINE CLINIC | Age: 34
End: 2019-10-25

## 2019-10-25 VITALS
TEMPERATURE: 97.2 F | OXYGEN SATURATION: 94 % | DIASTOLIC BLOOD PRESSURE: 90 MMHG | BODY MASS INDEX: 42.98 KG/M2 | WEIGHT: 283.6 LBS | HEART RATE: 89 BPM | SYSTOLIC BLOOD PRESSURE: 128 MMHG | RESPIRATION RATE: 16 BRPM | HEIGHT: 68 IN

## 2019-10-25 DIAGNOSIS — L23.9 ALLERGIC CONTACT DERMATITIS, UNSPECIFIED TRIGGER: ICD-10-CM

## 2019-10-25 DIAGNOSIS — F32.A DEPRESSION, UNSPECIFIED DEPRESSION TYPE: ICD-10-CM

## 2019-10-25 DIAGNOSIS — Z00.00 ROUTINE GENERAL MEDICAL EXAMINATION AT A HEALTH CARE FACILITY: Primary | ICD-10-CM

## 2019-10-25 DIAGNOSIS — E66.01 CLASS 3 SEVERE OBESITY DUE TO EXCESS CALORIES WITHOUT SERIOUS COMORBIDITY WITH BODY MASS INDEX (BMI) OF 40.0 TO 44.9 IN ADULT (HCC): ICD-10-CM

## 2019-10-25 DIAGNOSIS — Z23 ENCOUNTER FOR IMMUNIZATION: ICD-10-CM

## 2019-10-25 LAB
A-G RATIO,AGRAT: 1.6 RATIO (ref 1.1–2.6)
ALBUMIN SERPL-MCNC: 4.5 G/DL (ref 3.5–5)
ALP SERPL-CCNC: 59 U/L (ref 25–115)
ALT SERPL-CCNC: 14 U/L (ref 5–40)
ANION GAP SERPL CALC-SCNC: 12 MMOL/L
AST SERPL W P-5'-P-CCNC: 17 U/L (ref 10–37)
BILIRUB SERPL-MCNC: 0.5 MG/DL (ref 0.2–1.2)
BUN SERPL-MCNC: 13 MG/DL (ref 6–22)
CALCIUM SERPL-MCNC: 9 MG/DL (ref 8.4–10.5)
CHLORIDE SERPL-SCNC: 103 MMOL/L (ref 98–110)
CHOLEST SERPL-MCNC: 170 MG/DL (ref 110–200)
CO2 SERPL-SCNC: 25 MMOL/L (ref 20–32)
CREAT SERPL-MCNC: 0.7 MG/DL (ref 0.5–1.2)
ERYTHROCYTE [DISTWIDTH] IN BLOOD BY AUTOMATED COUNT: 13.1 % (ref 10–15.5)
GFRAA, 66117: >60
GFRNA, 66118: >60
GLOBULIN,GLOB: 2.8 G/DL (ref 2–4)
GLUCOSE SERPL-MCNC: 104 MG/DL (ref 70–99)
HCT VFR BLD AUTO: 44.3 % (ref 35.1–46.5)
HDLC SERPL-MCNC: 2.6 MG/DL (ref 0–5)
HDLC SERPL-MCNC: 65 MG/DL (ref 40–59)
HGB BLD-MCNC: 14.2 G/DL (ref 11.7–15.5)
LDLC SERPL CALC-MCNC: 77 MG/DL (ref 50–99)
MCH RBC QN AUTO: 29 PG (ref 26–34)
MCHC RBC AUTO-ENTMCNC: 32 G/DL (ref 31–36)
MCV RBC AUTO: 90 FL (ref 80–95)
PLATELET # BLD AUTO: 290 K/UL (ref 140–440)
PMV BLD AUTO: 10.5 FL (ref 9–13)
POTASSIUM SERPL-SCNC: 4.2 MMOL/L (ref 3.5–5.5)
PROT SERPL-MCNC: 7.3 G/DL (ref 6.4–8.3)
RBC # BLD AUTO: 4.92 M/UL (ref 3.8–5.2)
SODIUM SERPL-SCNC: 140 MMOL/L (ref 133–145)
TRIGL SERPL-MCNC: 141 MG/DL (ref 40–149)
TSH SERPL DL<=0.005 MIU/L-ACNC: 2.69 MCU/ML (ref 0.27–4.2)
VLDLC SERPL CALC-MCNC: 28 MG/DL (ref 8–30)
WBC # BLD AUTO: 6.6 K/UL (ref 4–11)

## 2019-10-25 NOTE — PATIENT INSTRUCTIONS
Vaccine Information Statement Influenza (Flu) Vaccine (Inactivated or Recombinant): What You Need to Know Many Vaccine Information Statements are available in [place] and other languages. See www.immunize.org/vis Hojas de información sobre vacunas están disponibles en español y en muchos otros idiomas. Visite www.immunize.org/vis 1. Why get vaccinated? Influenza vaccine can prevent influenza (flu). Flu is a contagious disease that spreads around the [place] [facility] every year, usually between October and May. Anyone can get the flu, but it is more dangerous for some people. Infants and young children, people 72years of age and older, pregnant women, and people with certain health conditions or a weakened immune system are at greatest risk of flu complications. Pneumonia, bronchitis, sinus infections and ear infections are examples of flu-related complications. If you have a medical condition, such as heart disease, cancer or diabetes, flu can make it worse. Flu can cause fever and chills, sore throat, muscle aches, fatigue, cough, headache, and runny or stuffy nose. Some people may have vomiting and diarrhea, though this is more common in children than adults. Each year thousands of people in the [facility] die from flu, and many more are hospitalized. Flu vaccine prevents millions of illnesses and flu-related visits to the doctor each year. 2. Influenza vaccines CDC recommends everyone 10months of age and older get vaccinated every flu season. Children 6 months through 6years of age may need 2 doses during a single flu season. Everyone else needs only 1 dose each flu season. It takes about 2 weeks for protection to develop after vaccination. There are many flu viruses, and they are always changing. Each year a new flu vaccine is made to protect against three or four viruses that are likely to cause disease in the upcoming flu season.  Even when the vaccine doesnt exactly match these viruses, it may still provide some protection. Influenza vaccine does not cause flu. Influenza vaccine may be given at the same time as other vaccines. 3. Talk with your health care provider Tell your vaccine provider if the person getting the vaccine: 
 Has had an allergic reaction after a previous dose of influenza vaccine, or has any severe, life-threatening allergies.  Has ever had Guillain-Barré Syndrome (also called GBS). In some cases, your health care provider may decide to postpone influenza vaccination to a future visit. People with minor illnesses, such as a cold, may be vaccinated. People who are moderately or severely ill should usually wait until they recover before getting influenza vaccine. Your health care provider can give you more information. 4. Risks of a reaction  Soreness, redness, and swelling where shot is given, fever, muscle aches, and headache can happen after influenza vaccine.  There may be a very small increased risk of Guillain-Barré Syndrome (GBS) after inactivated influenza vaccine (the flu shot). [facility] children who get the flu shot along with pneumococcal vaccine (PCV13), and/or DTaP vaccine at the same time might be slightly more likely to have a seizure caused by fever. Tell your health care provider if a child who is getting flu vaccine has ever had a seizure. People sometimes faint after medical procedures, including vaccination. Tell your provider if you feel dizzy or have vision changes or ringing in the ears. As with any medicine, there is a very remote chance of a vaccine causing a severe allergic reaction, other serious injury, or death. 5. What if there is a serious problem? An allergic reaction could occur after the vaccinated person leaves the clinic.  If you see signs of a severe allergic reaction (hives, swelling of the face and throat, difficulty breathing, a fast heartbeat, dizziness, or weakness), call 9-1-1 and get the person to the nearest hospital. 
 
For other signs that concern you, call your health care provider. Adverse reactions should be reported to the Vaccine Adverse Event Reporting System (VAERS). Your health care provider will usually file this report, or you can do it yourself. Visit the VAERS website at www.vaers. hhs.gov or call 1-448.136.3678. VAERS is only for reporting reactions, and VAERS staff do not give medical advice. 6. The National Vaccine Injury Compensation Program 
 
The [facility] Vaccine Injury Compensation Program (VICP) is a federal program that was created to compensate people who may have been injured by certain vaccines. Visit the VICP website at www.hrsa.gov/vaccinecompensation or call 7-262.459.2186 to learn about the program and about filing a claim. There is a time limit to file a claim for compensation. 7. How can I learn more?  Ask your health care provider.  Call your local or state health department.  Contact the Centers for Disease Control and Prevention (CDC): 
- Call 9-872.889.5851 (6-721-WNA-INFO) or 
- Visit CDCs influenza website at www.cdc.gov/flu Vaccine Information Statement (Interim) Inactivated Influenza Vaccine 8/15/2019 
42 AYDEE Olsen 114JJ-22 Department of Health and [employer] Centers for Disease Control and Prevention Office Use Only

## 2019-10-25 NOTE — PROGRESS NOTES
Assessment/Plan:    1. Routine general medical examination at a health care facility  - CBC W/O DIFF; Future  - METABOLIC PANEL, COMPREHENSIVE; Future  - LIPID PANEL; Future    2. Class 3 severe obesity due to excess calories without serious comorbidity with body mass index (BMI) of 40.0 to 44.9 in adult [facility])  -follow [id number] claudia diet    3. Depression, unspecified depression type  -gene sight testing today. She is aware if insur doesn't cover, will cost several hundred dollars  - TSH 3RD GENERATION; Future  On the basis of positive PHQ-9 screening (PHQ 9 Score: 22), gene sight testing done and will start rx based on results. Patient will follow-up in 6 week. 4. Encounter for immunization  - INFLUENZA VIRUS VAC QUAD,SPLIT,PRESV FREE SYRINGE IM  - OH IMMUNIZ ADMIN,1 SINGLE/COMB VAC/TOXOID  - TETANUS, DIPHTHERIA TOXOIDS AND ACELLULAR PERTUSSIS VACCINE (TDAP), IN INDIVIDS. >=7, IM    5. Allergic contact dermatitis, unspecified trigger  - REFERRAL TO ALLERGY      The plan was discussed with the patient. The patient verbalized understanding and is in agreement with the plan. All medication potential side effects were discussed with the patient. Health Maintenance:   Health Maintenance   Topic Date Due    Influenza Age 5 to Adult  08/01/2019    DTaP/Tdap/Td series (2 - Td) 10/15/2019    PAP AKA CERVICAL CYTOLOGY  09/21/2020    Pneumococcal 0-64 years  Aged [address] 63 Alana Ta is a 30 y.o. female and presents with Physical     Subjective:  Here for physical.  Has lost 13lb since last visit in 2017. Depression - no longer taking meds. She still has sx. She lost her job bc she wasn't working enough due to depression. She's now at a new job and she she mostly likes. She has a support system and has a boyfriend that she lives with. She feels she is coping well. She's looking for a new therapist.  She wants to do genesight testing b/c she has failed lexapro and wellbutrin.   Has also failed effexor. She notes allergic reaction on eyelids - swelling, itching. Doesn't wear eye makeup. She is requesting allergy testing. She has ridges in nails. Wonders if she has a vitamin deficiency. Started taking a vitamin w/o improvement. Obesity - hasn't had much success with losing wt. Tried to eat \"healthy\". States it's difficult to follow \"counting calories\". She doesn't exercise. ROS:  Constitutional: No recent weight change. No weakness/fatigue. No f/c. Skin: No rashes, change in nails/hair, itching   HENT: No HA, dizziness. No hearing loss/tinnitus. No nasal congestion/discharge. Eyes: No change in vision, double/blurred vision or eye pain/redness. Cardiovascular: No CP/palpitations. No GLEASON/orthopnea/PND. Respiratory: No cough/sputum, dyspnea, wheezing. Gastointestinal: No dysphagia, reflux. No n/v. No constipation/diarrhea. No melena/rectal bleeding. Genitourinary: No dysuria, urinary hesitancy, nocturia, hematuria. No incontinence. Musculoskeletal: +joint pain/no stiffness. No muscle pain/tenderness. Endo: No heat/cold intolerance, no polyuria/polydypsia. Heme: No h/o anemia. No easy bleeding/bruising. Allergy/Immunology: No seasonal rhinitis. Denies frequent colds, sinus/ear infections. Neurological: No seizures/numbness/weakness. No paresthesias. Psychiatric:  + depression, +anxiety. The problem list was updated as a part of today's visit. Patient Active Problem List   Diagnosis Code    Depression F32.9    History of pulmonary embolism Z86.711    May-Thurner syndrome I87.1    Cholelithiases K80.20    Nephrolithiasis N20.0    Venous reflux I87.2    RBBB I45.10       The PSH, FH were reviewed. SH:  Social History     Tobacco Use    Smoking status: Never Smoker    Smokeless tobacco: Never Used   Substance Use Topics    Alcohol use:  Yes     Alcohol/week: 0.0 - 1.0 standard drinks    Drug use: No       Medications/Allergies:  No current outpatient medications on file. Allergies   Allergen Reactions    Adhesive Rash    Arixtra [Fondaparinux] Rash    Other Medication Other (comments)     Hormone type medications causes clots    Sulfa (Sulfonamide Antibiotics) Rash       Objective:  Visit Vitals  /90 (BP 1 Location: Left arm, BP Patient Position: Sitting)   Pulse 89   Temp 97.2 °F (36.2 °C) (Oral)   Resp 16   Ht 5' 8\" (1.727 m)   Wt 283 lb 9.6 oz (128.6 kg)   LMP 10/23/2019   SpO2 94%   BMI 43.12 kg/m²      Constitutional: Well developed, nourished, no distress, alert, obese habitus   HENT: Exterior ears and tympanic membranes normal bilaterally. Supple neck. No thyromegaly or lymphadenopathy. Oropharynx clear and moist mucous membranes. Eyes: Conjunctiva normal. PERRL. Eyelids normal.   CV: S1, S2.  RRR. No murmurs/rubs. No edema. Pulm: No abnormalities on inspection. Clear to auscultation bilaterally. No wheezing/rhonchi. Normal effort. GI: Soft, nontender, nondistended. Normal active bowel sounds. MS: Gait normal.  Joints without deformity. +LLE circumference larger than R   Neuro: A/O x 3. No focal motor or sensory deficits. Speech normal.   Skin: No lesions/rashes on inspection. Psych: Appropriate affect, judgement and insight. Short-term memory intact.        3 most recent PHQ Screens 10/25/2019   Little interest or pleasure in doing things More than half the days   Feeling down, depressed, irritable, or hopeless More than half the days   Total Score PHQ 2 4   Trouble falling or staying asleep, or sleeping too much Nearly every day   Feeling tired or having little energy Nearly every day   Poor appetite, weight loss, or overeating Nearly every day   Feeling bad about yourself - or that you are a failure or have let yourself or your family down Nearly every day   Trouble concentrating on things such as school, work, reading, or watching TV Nearly every day   Moving or speaking so slowly that other people could have noticed; or the opposite being so fidgety that others notice Several days   Thoughts of being better off dead, or hurting yourself in some way More than half the days   PHQ 9 Score 22   How difficult have these problems made it for you to do your work, take care of your home and get along with others Extremely difficult

## 2019-10-25 NOTE — PROGRESS NOTES
Ely Martinez is a 30 y.o. female (: 1985) presenting to address:    Chief Complaint   Patient presents with    Physical       Vitals:    10/25/19 0940   BP: 128/90   Pulse: 89   Resp: 16   Temp: 97.2 °F (36.2 °C)   TempSrc: Oral   SpO2: 94%   Weight: 283 lb 9.6 oz (128.6 kg)   Height: 5' 8\" (1.727 m)   PainSc:   0 - No pain   LMP: 10/23/2019       Hearing/Vision:   No exam data present    Learning Assessment:     Learning Assessment 2015   PRIMARY LEARNER Patient   HIGHEST LEVEL OF EDUCATION - PRIMARY LEARNER  SOME COLLEGE   BARRIERS PRIMARY LEARNER NONE   CO-LEARNER CAREGIVER No   PRIMARY LANGUAGE ENGLISH   LEARNER PREFERENCE PRIMARY OTHER (COMMENT)   ANSWERED BY Patient   RELATIONSHIP SELF     Depression Screening:     3 most recent PHQ Screens 10/25/2019   Little interest or pleasure in doing things Not at all   Feeling down, depressed, irritable, or hopeless More than half the days   Total Score PHQ 2 2   Trouble falling or staying asleep, or sleeping too much -   Feeling tired or having little energy -   Poor appetite, weight loss, or overeating -   Feeling bad about yourself - or that you are a failure or have let yourself or your family down -   Trouble concentrating on things such as school, work, reading, or watching TV -   Moving or speaking so slowly that other people could have noticed; or the opposite being so fidgety that others notice -   Thoughts of being better off dead, or hurting yourself in some way -   PHQ 9 Score -   How difficult have these problems made it for you to do your work, take care of your home and get along with others -     Fall Risk Assessment:     Fall Risk Assessment, last 12 mths 10/12/2017   Able to walk? Yes   Fall in past 12 months? No     Abuse Screening:     Abuse Screening Questionnaire 10/25/2019   Do you ever feel afraid of your partner? N   Are you in a relationship with someone who physically or mentally threatens you?  N   Is it safe for you to go home? Y     Coordination of Care Questionaire:   1. Have you been to the ER, urgent care clinic since your last visit? Hospitalized since your last visit? NO    2. Have you seen or consulted any other health care providers outside of the [address] [place] [address] since your last visit? Include any pap smears or colon screening. NO    Advanced Directive:   1. Do you have an Advanced Directive? NO    2. Would you like information on Advanced Directives?  NO

## 2019-10-31 ENCOUNTER — TELEPHONE (OUTPATIENT)
Dept: FAMILY MEDICINE CLINIC | Age: 34
End: 2019-10-31

## 2019-10-31 NOTE — TELEPHONE ENCOUNTER
Spoke to pt to see if she was interested to start on recommended med from Dr. Ollie Quinn. Pt said she is waiting on the results from [facility] to get faxed to her so she can review it and possibly review it with a therapist. Previously, a therapist suggested she start on  ADD meds first before adding depression meds. Pt is looking for a psychiatric group to make an appt with a new therapist, her old one left the office. Pt also asked for an order for compression hose. Faxed to [facility].

## 2020-01-13 ENCOUNTER — TELEPHONE (OUTPATIENT)
Dept: FAMILY MEDICINE CLINIC | Age: 35
End: 2020-01-13

## 2020-01-13 NOTE — TELEPHONE ENCOUNTER
Returned pt call. Advised ED but offered to [facility] appt tomorrow at 9am. Pt doesn't think she can skip work in the morning. Gave pt 245 pm timeframe but asked her to call back if she can come in the morning.

## 2020-01-13 NOTE — TELEPHONE ENCOUNTER
Patient called asking for an order for a stat PVL. Pt has health insurance now. She has been going back and forth to [place] where her mom is very sick. Her left leg is swollen and tinged pink. She has a hx of dvt in that leg, had a stent in it and was on Xarelto previously. She said it's not purple and she'd rather not go to the ED.

## 2020-01-14 NOTE — TELEPHONE ENCOUNTER
Pt was seen at the hospital overnight. Negative pvl. She rescheduled follow up for later in the month.

## 2020-01-28 ENCOUNTER — OFFICE VISIT (OUTPATIENT)
Dept: FAMILY MEDICINE CLINIC | Age: 35
End: 2020-01-28

## 2020-01-28 VITALS
SYSTOLIC BLOOD PRESSURE: 128 MMHG | RESPIRATION RATE: 16 BRPM | HEIGHT: 68 IN | WEIGHT: 276 LBS | DIASTOLIC BLOOD PRESSURE: 84 MMHG | HEART RATE: 89 BPM | TEMPERATURE: 97.5 F | BODY MASS INDEX: 41.83 KG/M2 | OXYGEN SATURATION: 99 %

## 2020-01-28 DIAGNOSIS — M54.50 CHRONIC BILATERAL LOW BACK PAIN WITHOUT SCIATICA: Primary | ICD-10-CM

## 2020-01-28 DIAGNOSIS — I87.2 VENOUS REFLUX: ICD-10-CM

## 2020-01-28 DIAGNOSIS — G89.29 CHRONIC BILATERAL LOW BACK PAIN WITHOUT SCIATICA: Primary | ICD-10-CM

## 2020-01-28 PROBLEM — K80.20 CALCULUS OF GALLBLADDER WITHOUT CHOLECYSTITIS WITHOUT OBSTRUCTION: Status: RESOLVED | Noted: 2020-01-28 | Resolved: 2020-01-28

## 2020-01-28 PROBLEM — K80.20 CALCULUS OF GALLBLADDER WITHOUT CHOLECYSTITIS WITHOUT OBSTRUCTION: Status: ACTIVE | Noted: 2020-01-28

## 2020-01-28 RX ORDER — DESVENLAFAXINE SUCCINATE 50 MG/1
50 TABLET, EXTENDED RELEASE ORAL
COMMUNITY

## 2020-01-28 RX ORDER — LORAZEPAM 1 MG/1
TABLET ORAL
COMMUNITY

## 2020-01-28 NOTE — PROGRESS NOTES
Luther Kemp is a 30 y.o. female (: 1985) presenting to address:    Chief Complaint   Patient presents with    Ankle swelling     Left arm       Vitals:    20 1043   BP: 128/84   Pulse: 89   Resp: 16   Temp: 97.5 °F (36.4 °C)   TempSrc: Oral   SpO2: 99%   Weight: 276 lb (125.2 kg)   Height: 5' 8\" (1.727 m)   PainSc:   5   PainLoc: Back   LMP: 2020       Hearing/Vision:   No exam data present    Learning Assessment:     Learning Assessment 2015   PRIMARY LEARNER Patient   HIGHEST LEVEL OF EDUCATION - PRIMARY LEARNER  SOME COLLEGE   BARRIERS PRIMARY LEARNER NONE   CO-LEARNER CAREGIVER No   PRIMARY LANGUAGE ENGLISH   LEARNER PREFERENCE PRIMARY OTHER (COMMENT)   ANSWERED BY Patient   RELATIONSHIP SELF     Depression Screening:     3 most recent PHQ Screens 10/25/2019   Little interest or pleasure in doing things More than half the days   Feeling down, depressed, irritable, or hopeless More than half the days   Total Score PHQ 2 4   Trouble falling or staying asleep, or sleeping too much Nearly every day   Feeling tired or having little energy Nearly every day   Poor appetite, weight loss, or overeating Nearly every day   Feeling bad about yourself - or that you are a failure or have let yourself or your family down Nearly every day   Trouble concentrating on things such as school, work, reading, or watching TV Nearly every day   Moving or speaking so slowly that other people could have noticed; or the opposite being so fidgety that others notice Several days   Thoughts of being better off dead, or hurting yourself in some way More than half the days   PHQ 9 Score 22   How difficult have these problems made it for you to do your work, take care of your home and get along with others Extremely difficult     Fall Risk Assessment:     Fall Risk Assessment, last 12 mths 10/12/2017   Able to walk? Yes   Fall in past 12 months?  No     Abuse Screening:     Abuse Screening Questionnaire 10/25/2019   Do you ever feel afraid of your partner? N   Are you in a relationship with someone who physically or mentally threatens you? N   Is it safe for you to go home? Y     Coordination of Care Questionaire:   1. Have you been to the ER, urgent care clinic since your last visit? Hospitalized since your last visit? YES    2. Have you seen or consulted any other health care providers outside of the [address] [place] [address] since your last visit? Include any pap smears or colon screening. YES, [facility]. [facility]    Advanced Directive:   1. Do you have an Advanced Directive? NO    2. Would you like information on Advanced Directives?  NO

## 2020-01-28 NOTE — PATIENT INSTRUCTIONS
Learning About Venous Insufficiency What is it? Venous insufficiency is a problem with the flow of blood from the veins of the legs back to the heart. It's also called chronic venous insufficiency or chronic venous stasis. Your veins bring blood back to the heart after it flows through your body. Veins have valves that keep the blood moving in one directiontoward the heart. But with venous insufficiency, the veins of the legs might not work as they should. This can allow blood to leak backward. Fluid can pool in the legs. This can lead to problems that include varicose veins. What causes it? Venous insufficiency is sometimes caused by deep vein thrombosis and high blood pressure inside leg veins. You are more likely to have venous insufficiency if you: · Are older. · Are female. · Are overweight. · Don't get enough physical activity. · Smoke. · Have a family history of varicose veins. What are the symptoms? Symptoms of venous insufficiency affect the legs. They may include: · Swelling, often in the ankles. · A rash. · Varicose veins. · Itching. · Cramping. · Skin sores (ulcers). · Aching or a feeling of heaviness. · Changes in skin color. How is it diagnosed? Your doctor can diagnose venous insufficiency by examining your legs and by using a type of ultrasound test (duplex Doppler) to find out how well blood is flowing in your legs. How is it treated? To reduce swelling and relieve pain caused by venous insufficiency, you can wear compression stockings. They are tighter at the ankles than at the top of the legs. They also can help venous skin ulcers heal. But there are different types of stockings, and they need to fit right. So your doctor will recommend what you need. You also can try to: · Get more exercise, especially walking. It can increase blood flow. · Avoid standing still or sitting for a long time, which can make the fluid pool in your legs. · Try not to sit with your legs crossed at the knee. · Keep your legs raised above your heart when you're lying down. This reduces swelling. If these treatments don't work, you may need medicine or a procedure to help relieve symptoms. Procedures might be done to close the vein, to remove the vein, or to improve blood flow. Follow-up care is a key part of your treatment and safety. Be sure to make and go to all appointments, and call your doctor if you are having problems. It's also a good idea to know your test results and keep a list of the medicines you take. Current as of: September 26, 2018 Content Version: 12.2 © 5621-2233 [employer]. Care instructions adapted under license by [employer] (which disclaims liability or warranty for this information). If you have questions about a medical condition or this instruction, always ask your healthcare professional. [place] [address] any warranty or liability for your use of this information.

## 2020-01-28 NOTE — PROGRESS NOTES
Assessment/Plan:    1. Venous reflux  -compression stockings  - REFERRAL TO VASCULAR SURGERY    2. Chronic bilateral low back pain without sciatica  -PT. Work on wt loss. - REFERRAL TO PHYSICAL THERAPY      The plan was discussed with the patient. The patient verbalized understanding and is in agreement with the plan. All medication potential side effects were discussed with the patient. Health Maintenance:   Health Maintenance   Topic Date Due    PAP AKA CERVICAL CYTOLOGY  09/21/2020    DTaP/Tdap/Td series (3 - Td) 10/25/2029    Influenza Age 5 to Adult  Completed    Pneumococcal 0-64 years  Aged [address] Vernell Jacobo is a 30 y.o. female and presents with Ankle swelling (Left arm)     Subjective:  Pt was seen in ER 1/13 for leg swelling. PVL showed venous reflux, no DVT. This started after driving frequently to [place].   She always has swelling and pain. She's not wearing compression stockings. Pt notes urinary urgency. She feels it's \"always there\". Also has \"kidney pain\". She notes chronic back pain, bilat low back. Reportedly had CT of low back and had \"degenerative discs\". ROS:  Constitutional: No recent weight change. No weakness/fatigue. No f/c. Cardiovascular: No CP/palpitations. No GLEASON/orthopnea/PND. Respiratory: No cough/sputum, dyspnea, wheezing. Gastointestinal: No dysphagia, reflux. No n/v. No constipation/diarrhea. No melena/rectal bleeding. Genitourinary: No dysuria, urinary hesitancy, nocturia, hematuria. No incontinence. Musculoskeletal: No joint pain/stiffness. No muscle pain/tenderness. The problem list was updated as a part of today's visit.   Patient Active Problem List   Diagnosis Code    Depression F32.9    History of pulmonary embolism Z86.711    May-Thurner syndrome I87.1    Cholelithiases K80.20    Nephrolithiasis N20.0    Venous reflux I87.2    RBBB I45.10    Class 3 severe obesity due to excess calories without serious comorbidity with body mass index (BMI) of 40.0 to 44.9 in adult ([facility]) E66.01, Z68.41       The PS,  were reviewed. SH:  Social History     Tobacco Use    Smoking status: Never Smoker    Smokeless tobacco: Never Used   Substance Use Topics    Alcohol use: Yes     Alcohol/week: 0.0 - 1.0 standard drinks    Drug use: No       Medications/Allergies:  Current Outpatient Medications on File Prior to Visit   Medication Sig Dispense Refill    lisdexamfetamine (VYVANSE) 30 mg capsule Take 30 mg by mouth every morning.  LORazepam (ATIVAN) 1 mg tablet Take  by mouth every four (4) hours as needed for Anxiety.  desvenlafaxine succinate (PRISTIQ) 50 mg ER tablet Take 50 mg by mouth. No current facility-administered medications on file prior to visit. Allergies   Allergen Reactions    Adhesive Rash    Arixtra [Fondaparinux] Rash    Other Medication Other (comments)     Hormone type medications causes clots    Sulfa (Sulfonamide Antibiotics) Rash    Wellbutrin [Bupropion Hcl] Hives       Objective:  Visit Vitals  /84 (BP 1 Location: Left arm, BP Patient Position: Sitting)   Pulse 89   Temp 97.5 °F (36.4 °C) (Oral)   Resp 16   Ht 5' 8\" (1.727 m)   Wt 276 lb (125.2 kg)   LMP 01/07/2020 (Exact Date)   SpO2 99%   BMI 41.97 kg/m²      Constitutional: Well developed, nourished, no distress, alert, obese habitus   CV: S1, S2.  RRR. No murmurs/rubs. 1+ pitting edema. Pulm: No abnormalities on inspection. Clear to auscultation bilaterally. No wheezing/rhonchi. Normal effort. MS: Gait normal.  Joints without deformity/tenderness. No lumbar paraspinal tenderness.

## 2020-02-06 ENCOUNTER — HOSPITAL ENCOUNTER (OUTPATIENT)
Dept: PHYSICAL THERAPY | Age: 35
Discharge: HOME | End: 2020-02-06
Payer: COMMERCIAL

## 2020-02-06 PROCEDURE — 97535 SELF CARE MNGMENT TRAINING: CPT | Performed by: PHYSICAL THERAPIST

## 2020-02-06 PROCEDURE — 97161 PT EVAL LOW COMPLEX 20 MIN: CPT | Performed by: PHYSICAL THERAPIST

## 2020-02-06 NOTE — PROGRESS NOTES
[facility] PHYSICAL [facility]ER[facility] - DAILY TREATMENT NOTE    Patient Name: Nash Pelayo        Date: 2020  : 1985   yes Patient  Verified  Visit #:   1   of   5  Insurance: Payor: Barbi West / Plan: [address] PT / Product Type: Commerical /      In time: 605 Out time: 045   Total Treatment Time: 30     Medicare/[facility] Time Tracking (below)   Total Timed Codes (min):  na 1:1 Treatment Time:  na     TREATMENT AREA =  Low back pain [M54.5]    SUBJECTIVE  Pain Level (on 0 to 10 scale):  4  / 10   Medication Changes/New allergies or changes in medical history, any new surgeries or procedures?    no  If yes, update Summary List   Subjective Functional Status/Changes:  []  No changes reported     See ie          OBJECTIVE      10 min Self Care: Hep/activity modification   Rationale:    increase ROM, increase strength and decrease pain to improve the patients ability to complete adls    Billed With/As:   [] TE   [] TA   [] Neuro   [] Self Care Patient Education: [x] Review HEP    [] Progressed/Changed HEP based on:   [] positioning   [] body mechanics   [] transfers   [] heat/ice application    [] other:      Other Objective/Functional Measures:    No increase in pain following MET correction      Post Treatment Pain Level (on 0 to 10) scale:   4  / 10     ASSESSMENT  Assessment/Changes in Function:     See ie     []  See Progress Note/Recertification   Patient will continue to benefit from skilled PT services to modify and progress therapeutic interventions, address functional mobility deficits, address ROM deficits, address strength deficits, analyze and address soft tissue restrictions, analyze and cue movement patterns, analyze and modify body mechanics/ergonomics, assess and modify postural abnormalities and instruct in home and community integration to attain remaining goals.    Progress toward goals / Updated goals:    See ie     PLAN  []  Upgrade activities as tolerated yes Continue plan of care   [] Discharge due to :    []  Other:      Therapist: Harlan Varma, PT    Date: 2/6/2020 Time: 6:37 PM     Future Appointments   Date Time Provider Valeriy Chakraborty   2/11/2020  9:00 AM Maya Cotto [facility]   2/20/2020  9:30 AM Tiffany Ulrich [facility]   2/25/2020  9:30 AM Serg Tiffany Pelayo [facility]   3/3/2020  9:30 AM Zoya Del Cid, PT [id number] [facility]

## 2020-02-06 NOTE — PROGRESS NOTES
[facility] PHYSICAL THERAPY   [address],[facility], [address] [facility] - Phone: [phone]  Fax: [phone] / [facility]  Patient Name: Herlinda Lee : 1985   Medical   Diagnosis: LBP Treatment Diagnosis: Low back pain [M54.5]   Onset Date: chronic     Referral Source: Joon Sanabria MD Start of Care [facility]): 2020   Prior Hospitalization: See medical history Provider #: [id number]   Prior Level of Function: Sedentary    Comorbidities: Depression, alcohol use, DVT leg   Medications: Verified on Patient Summary List   The Plan of Care and following information is based on the information from the initial evaluation.   ===========================================================================================  Assessment / key information:  30 F arrives to clinic with c/o centralized lbp without radiculopathy. Pt reports insidious onset and has been living with current pain for several years. Recent medical procedure required imaging and demo lumbar DDD and referred to PT. Currently rates pain 8/10 at worst with bending forward or twisting and 3/10 at best with supine position. Objective findin% loss of t/s l rotation with increased R paraspinal tightness, 25% loss of lumbar RR and extension with pain noted at end range, -slump, SLR, +SI screen +thigh thrust and posterior sheer, noted R anterior inn with outflare. Poor core stabilizer activation.  Will attempt PT in order to address problem list below  ===========================================================================================  Eval Complexity: History MEDIUM  Complexity : 1-2 comorbidities / personal factors will impact the outcome/ POC ;  Examination  MEDIUM Complexity : 3 Standardized tests and measures addressing body structure, function, activity limitation and / or participation in recreation ; Presentation LOW Complexity : Stable, uncomplicated ;  Decision Making MEDIUM Complexity : FOTO score of 26-74; Overall Complexity LOW   Problem List: pain affecting function, decrease ROM, decrease strength, impaired gait/ balance, decrease ADL/ functional abilitiies, decrease activity tolerance, decrease flexibility/ joint mobility and decrease transfer abilities   Treatment Plan may include any combination of the following: Therapeutic exercise, Therapeutic activities, Neuromuscular re-education, Physical agent/modality, Gait/balance training, Manual therapy, Patient education, Self Care training and Functional mobility training  Patient / Family readiness to learn indicated by: asking questions, trying to perform skills and interest  Persons(s) to be included in education: patient (P)  Barriers to Learning/Limitations: yes  Measures taken, if barriers to learning: Reduce frequency of visits    Patient Goal (s): Reduce pain   Patient self reported health status: poor  Rehabilitation Potential: fair   Short Term Goals: To be accomplished in  2  treatments:  1. Pt will be compliant with hep  2. Pt will be able to actively contract transverse abdominal and multifidi in order to improve axial stability during transfers    Long Term Goals: To be accomplished in  4  treatments:  1. Pt will note daily max pain </=3/10 in order to show functional improvement  2. Pt will be I with advanced hep in order to prepare for d/c  3.  Pt will increase FOTO score by 5 points in order to show functional improvement   Frequency / Duration:   Patient to be seen  1  times per week for 4  treatments:  Patient / Caregiver education and instruction: self care, activity modification and exercises  Therapist Signature: Sabra Akins PT Date: 5/6/1215   Certification Period: na Time: 6:41 PM   ===========================================================================================  I certify that the above Physical Therapy Services are being furnished while the patient is under my care. I agree with the treatment plan and certify that this therapy is necessary. Physician Signature:        Date:       Time:     Please sign and return to [facility] at [facility], [facility]. or you may fax the signed copy to [phone]. Thank you.

## 2020-02-11 ENCOUNTER — HOSPITAL ENCOUNTER (OUTPATIENT)
Dept: PHYSICAL THERAPY | Age: 35
Discharge: HOME | End: 2020-02-11
Payer: COMMERCIAL

## 2020-02-11 PROCEDURE — 97110 THERAPEUTIC EXERCISES: CPT

## 2020-02-11 PROCEDURE — 97140 MANUAL THERAPY 1/> REGIONS: CPT

## 2020-02-11 NOTE — PROGRESS NOTES
PHYSICAL THERAPY - DAILY TREATMENT NOTE    Patient Name: Tasha Pelayo        Date: 2020  : 1985   yes Patient  Verified  Visit #:   2   of   5  Insurance: Payor: Tone Hayden / Plan: [address] PT / Product Type: Commerical /      In time: 9:10 Out time: 9:45   Total Treatment Time: 35     Medicare/[facility] Time Tracking (below)   Total Timed Codes (min):  n/a 1:1 Treatment Time:  n/a     TREATMENT AREA =  Low back pain [M54.5]    SUBJECTIVE  Pain Level (on 0 to 10 scale): 3  / 10   Medication Changes/New allergies or changes in medical history, any new surgeries or procedures?    no  If yes, update Summary List   Subjective Functional Status/Changes:  []  No changes reported     Patient reports being more aware with her body mechanics when she's sitting and standing (ie: avoiding tucking her leg underneath her butt). States she's been doing her HEP. OBJECTIVE    15 min Therapeutic Exercise:  [x]  See flow sheet   Rationale:      increase ROM and increase strength to improve the patients ability to perform pain free functional ADLs. 20 min Manual Therapy: DTM to (B) L/S paraspinals and (B) GM/ piriformis   Rationale:      decrease pain, increase ROM and increase tissue extensibility to improve patient's ability to perform pain free functional ADLs. Billed With/As:   [x] TE   [] TA   [] Neuro   [] Self Care Patient Education: [x] Review HEP    [] Progressed/Changed HEP based on:   [] positioning   [] body mechanics   [] transfers   [] heat/ice application    [] other:      Other Objective/Functional Measures:    Initiated therex per POC (see flowsheet) to improve spinal mobility/flexibility and core/glute stability for functional ADLs. Req'd cues 100 % of therex for proper form/technique due to 1st F/U appt. SIJ even, no MET correction needed. Significant TTP in muscle of (B) glute med noted during MT.      Post Treatment Pain Level (on 0 to 10) scale:  2  / 10 ASSESSMENT  Assessment/Changes in Function:     Patient noted reduced pain level and improved spinal mobility following PT session indicating good response to initiation of PT interventions. []  See Progress Note/Recertification   Patient will continue to benefit from skilled PT services to modify and progress therapeutic interventions, address functional mobility deficits, address ROM deficits, address strength deficits, analyze and address soft tissue restrictions, analyze and cue movement patterns, analyze and modify body mechanics/ergonomics and assess and modify postural abnormalities to attain remaining goals. Progress toward goals / Updated goals: · Short Term Goals: To be accomplished in  2  treatments:  1. Pt will be compliant with hep progressing 02/11; reports HEP compliance   2. Pt will be able to actively contract transverse abdominal and multifidi in order to improve axial stability during transfers  progressing 02/11  · Long Term Goals: To be accomplished in  4  treatments:  1. Pt will note daily max pain </=3/10 in order to show functional improvement  2. Pt will be I with advanced hep in order to prepare for d/c  3.  Pt will increase FOTO score by 5 points in order to show functional improvement      PLAN  [x]  Upgrade activities as tolerated yes Continue plan of care   []  Discharge due to :    []  Other:      Therapist: MAURISIO Sheehan    Date: 2/11/2020 Time: 10:09 AM     Future Appointments   Date Time Provider Valeriy Chakraborty   2/11/2020  9:00 AM Nilo Ulrich [facility]   2/20/2020  9:30 AM Nilo Ulrich [facility]   2/25/2020  9:30 AM Nilo Ulrihc [facility]   3/3/2020  9:30 AM Danuta Del Cid, PT [facility]

## 2020-02-20 ENCOUNTER — HOSPITAL ENCOUNTER (OUTPATIENT)
Dept: PHYSICAL THERAPY | Age: 35
End: 2020-02-20
Payer: COMMERCIAL

## 2020-02-25 ENCOUNTER — HOSPITAL ENCOUNTER (OUTPATIENT)
Dept: PHYSICAL THERAPY | Age: 35
Discharge: HOME | End: 2020-02-25
Payer: COMMERCIAL

## 2020-02-25 PROCEDURE — 97140 MANUAL THERAPY 1/> REGIONS: CPT

## 2020-02-25 PROCEDURE — 97110 THERAPEUTIC EXERCISES: CPT

## 2020-02-25 NOTE — PROGRESS NOTES
PHYSICAL THERAPY - DAILY TREATMENT NOTE    Patient Name: Oralia Pelayo        Date: 2020  : 1985   yes Patient  Verified  Visit #:   3   of   5  Insurance: Payor: Jarvis Winters / Plan[address] / Product Type: Commerical /      In time: 9:35 Out time: 10:20   Total Treatment Time: 45     Medicare/[facility] Time Tracking (below)   Total Timed Codes (min):  n/a 1:1 Treatment Time:  n/a     TREATMENT AREA =  Low back pain [M54.5]    SUBJECTIVE  Pain Level (on 0 to 10 scale): 3-4  / 10   Medication Changes/New allergies or changes in medical history, any new surgeries or procedures?    no  If yes, update Summary List   Subjective Functional Status/Changes:  []  No changes reported     Patient reports going through a lot of stress right now with having to plan her mom's  and hasn't been able to do her exercises consistently. Denies any radicular symptoms. OBJECTIVE    30 min Therapeutic Exercise:  [x]  See flow sheet   Rationale:      increase ROM and increase strength to improve the patients ability to perform pain free functional ADLs. 15 min Manual Therapy: DTM to (B) L/S paraspinals, (B) QL, and (L) GM/ piriformis; sacral mobs    Rationale:      decrease pain, increase ROM and increase tissue extensibility to improve patient's ability to perform pain free functional ADLs. Billed With/As:   [x] TE   [] TA   [] Neuro   [] Self Care Patient Education: [x] Review HEP    [] Progressed/Changed HEP based on:   [] positioning   [] body mechanics   [] transfers   [] heat/ice application    [] other:      Other Objective/Functional Measures:    Performed NuStep followed by therex then MT at end. MET correction not needed, SIJ even. Significant TTP and increase muscle tightness in R glute med noted during MT  Continued therex per flowsheet.       Post Treatment Pain Level (on 0 to 10) scale:  2   10     ASSESSMENT  Assessment/Changes in Function:     Patient continues to note reduced pain and spinal mobility following PT sessions. Pt verbalized that she would be more compliant with HEP because she feels better after doing her exercises. []  See Progress Note/Recertification   Patient will continue to benefit from skilled PT services to modify and progress therapeutic interventions, address functional mobility deficits, address ROM deficits, address strength deficits, analyze and address soft tissue restrictions, analyze and cue movement patterns, analyze and modify body mechanics/ergonomics and assess and modify postural abnormalities to attain remaining goals. Progress toward goals / Updated goals:    · Long Term Goals: To be accomplished in  4  treatments:  1. Pt will note daily max pain </=3/10 in order to show functional improvement progressing 02/25 indicated by pre vs post tx session  2. Pt will be I with advanced hep in order to prepare for d/c  3.  Pt will increase FOTO score by 5 points in order to show functional improvement      PLAN  [x]  Upgrade activities as tolerated yes Continue plan of care   []  Discharge due to :    []  Other:      Therapist: MAURISIO Buchanan    Date: 2/25/2020 Time: 12:08 PM     Future Appointments   Date Time Provider Valeriy Chakraborty   2/25/2020  9:30 AM Germania Ulrich [facility]   3/3/2020  9:30 AM Whit Segura PT [facility]   3/10/2020  9:30 AM Anita Ulrich [facility]

## 2020-03-03 ENCOUNTER — HOSPITAL ENCOUNTER (OUTPATIENT)
Dept: PHYSICAL THERAPY | Age: 35
Discharge: HOME | End: 2020-03-03
Payer: COMMERCIAL

## 2020-03-03 PROCEDURE — 97140 MANUAL THERAPY 1/> REGIONS: CPT | Performed by: PHYSICAL THERAPIST

## 2020-03-03 PROCEDURE — 97110 THERAPEUTIC EXERCISES: CPT | Performed by: PHYSICAL THERAPIST

## 2020-03-03 NOTE — PROGRESS NOTES
[facility] PHYSICAL THERAPY - DAILY TREATMENT NOTE    Patient Name: Graciela Pelayo        Date: 3/3/2020  : 1985   yes Patient  Verified  Visit #:   4   of   5  Insurance: Payor: Lashon Zapata / Plan: [address] PT / Product Type: Commerical /      In time: 925 Out time: 4087   Total Treatment Time: 50     Medicare/[facility] Time Tracking (below)   Total Timed Codes (min):  na 1:1 Treatment Time:  na     TREATMENT AREA =  Low back pain [M54.5]    SUBJECTIVE  Pain Level (on 0 to 10 scale):  3  / 10   Medication Changes/New allergies or changes in medical history, any new surgeries or procedures?    no  If yes, update Summary List   Subjective Functional Status/Changes:  []  No changes reported     When I do my exercises I get immediate relief but if I do not do them I have pain.  I just need to get on a more consistent schedule with them and I think that I wwill see more          OBJECTIVE    30 min Therapeutic Exercise:  [x]  See flow sheet   Rationale:      increase ROM and increase strength to improve the patients ability to complete adls     20 min Manual Therapy: dtm t/s and l/s paraspinals, sacral flexion, RR, cfm l5/s1 junction, t10-l1 L rot mob, glute med release   Rationale:      decrease pain, increase ROM, increase tissue extensibility and decrease trigger points to improve patient's ability to complete adls      Billed With/As:   [] TE   [] TA   [] Neuro   [] Self Care Patient Education: [x] Review HEP    [] Progressed/Changed HEP based on:   [] positioning   [] body mechanics   [] transfers   [] heat/ice application    [] other:      Other Objective/Functional Measures:    Significant tenderness along tl junction (r t/s paraspinals) and L ls junction and posterior hip  Progressed te as per flow sheet     Post Treatment Pain Level (on 0 to 10) scale:   2  / 10     ASSESSMENT  Assessment/Changes in Function:     No increase in pain following progression      []  See Progress Note/Recertification Patient will continue to benefit from skilled PT services to modify and progress therapeutic interventions, address functional mobility deficits, address ROM deficits, address strength deficits, analyze and address soft tissue restrictions, analyze and cue movement patterns, analyze and modify body mechanics/ergonomics, assess and modify postural abnormalities and instruct in home and community integration to attain remaining goals.    Progress toward goals / Updated goals:    Discussed continuation of therapy vs d/c with hep and pt will make determination next session   LTG #1 - still continues with pain up to 6/10 with sitting (slow progress)  LTG#2  - intermittent compliance with hep (slow progress)  LTG #3 - will perform FOTO next session      PLAN  []  Upgrade activities as tolerated yes Continue plan of care   []  Discharge due to :    []  Other:      Therapist: Nicole Bullock PT    Date: 3/3/2020 Time: 7:56 AM     Future Appointments   Date Time Provider Valeriy Chakraborty   3/3/2020  9:30 AM ABRAHAM Paige [facility]   3/10/2020  9:30 AM [facility]

## 2020-03-10 ENCOUNTER — HOSPITAL ENCOUNTER (OUTPATIENT)
Dept: PHYSICAL THERAPY | Age: 35
Discharge: HOME | End: 2020-03-10
Payer: COMMERCIAL

## 2020-03-10 PROCEDURE — 97140 MANUAL THERAPY 1/> REGIONS: CPT

## 2020-03-10 PROCEDURE — 97110 THERAPEUTIC EXERCISES: CPT

## 2020-03-10 NOTE — PROGRESS NOTES
[facility] [place] [address] [place] [address], [facility] - Phone: [phone]  Fax: [phone]  DISCHARGE SUMMARY  Patient Name: Robin Casarez : 1985   Treatment/Medical Diagnosis: Low back pain [M54.5]   Referral Source: Sissy Rey MD     Date of Initial Visit: 20 Attended Visits: 5 Missed Visits: 1     SUMMARY OF TREATMENT  PT interventions have included manual therapy, therapeutic exercises, patient education, and HEP to improve spinal mobility/flexibility and core/glute stability. CURRENT STATUS  Patient reports 40% overall improvement in low back symptoms since beginning PT. In the last 2 weeks, min pain 0/10, avg pain 2-3/10, and max pain 5/10. Notices most improvement with ability to bend forward (ie: dressing and don/doff shoes). Also notes significant improvement with functional mobility tolerance and endurance after consistently performing basic HEP. Demonstrates good understanding with advanced, long-term HEP and importance with HEP compliance to self manage symptoms in prep for D/C. Current FOTO = 63/100 (increase 10 points) and +3 on GROC indicating functional improvement. Goal/Measure of Progress Goal Met? 1. Pt will note daily max pain </=3/10 in order to show functional improvement   Status at last Eval: 8/10 Current Status: 5/10 progressing   2. Pt will be I with advanced hep in order to prepare for d/c   Status at last Eval: n/a Current Status: Advanced HEP established progressing   3. Pt will increase FOTO score by 5 points in order to show functional improvement    Status at last Eval: 53/100 Current Status: 63/100 met     RECOMMENDATIONS  Other: pt self discharged  Thank you for this referral.     If you have any questions/comments please contact us directly at [phone]. Thank you for allowing us to assist in the care of your patient.     Therapist Signature: MAURISIO Peck Date: 03/17/20      Time: 10:46 AM

## 2020-03-10 NOTE — PROGRESS NOTES
PHYSICAL THERAPY - DAILY TREATMENT NOTE    Patient Name: Dyan Pelayo        Date: 3/10/2020  : 1985   yes Patient  Verified  Visit #:   5   of   5  Insurance: Payor: Davy Single / Plan: 50 [facility] / Product Type: Commerical /      In time: 9:42 Out time: 10:26   Total Treatment Time: 44     Medicare/[employer] Time Tracking (below)   Total Timed Codes (min):  n/a 1:1 Treatment Time:  n/a     TREATMENT AREA =  Low back pain [M54.5]    SUBJECTIVE  Pain Level (on 0 to 10 scale): 3  / 10   Medication Changes/New allergies or changes in medical history, any new surgeries or procedures?    no  If yes, update Summary List   Subjective Functional Status/Changes:  []  No changes reported     Patient reports 40% overall improvement in low back symptoms since beginning PT. In the last 2 weeks, min pain 0/10, avg pain 2-3/10, and max pain 5/10. Notices most improvement with ability to bend forward (ie: dressing and don/doff shoes). SEE D/C       OBJECTIVE    30 min Therapeutic Exercise:  [x]  See flow sheet   Rationale:      increase ROM and increase strength to improve the patients ability to perform pain free functional ADLs. 14 min Manual Therapy: DTM to (B) distal T/S and L/S paraspinals, (B) QL   Rationale:      decrease pain, increase ROM and increase tissue extensibility to improve patient's ability to perform pain free functional ADLs.     Billed With/As:   [x] TE   [] TA   [] Neuro   [] Self Care Patient Education: [x] Review HEP    [] Progressed/Changed HEP based on:   [] positioning   [] body mechanics   [] transfers   [] heat/ice application    [] other:      Other Objective/Functional Measures:    SEE D/C     Post Treatment Pain Level (on 0 to 10) scale:   10     ASSESSMENT  Assessment/Changes in Function:     SEE D/C     []  See Progress Note/Recertification   Patient will continue to benefit from skilled PT services to modify and progress therapeutic interventions, address functional mobility deficits, address ROM deficits, address strength deficits, analyze and address soft tissue restrictions, analyze and cue movement patterns, analyze and modify body mechanics/ergonomics and assess and modify postural abnormalities to attain remaining goals.    Progress toward goals / Updated goals:    SEE D/C     PLAN  []  Upgrade activities as tolerated no Continue plan of care   [x]  Discharge due to : Completed PT program-able to self manage symptoms wih HEP; progressing towards LTGs   []  Other:      Therapist: MAURISIO Marion    Date: 3/10/2020 Time: 1:00 PM     Future Appointments   Date Time Provider Valeriy Chakraborty   3/10/2020  9:30 AM Manan CINTRON [facility]

## 2020-09-23 ENCOUNTER — APPOINTMENT (OUTPATIENT)
Dept: PHYSICAL THERAPY | Age: 35
End: 2020-09-23

## 2020-10-30 ENCOUNTER — HOSPITAL ENCOUNTER (OUTPATIENT)
Dept: PHYSICAL THERAPY | Age: 35
Discharge: HOME | End: 2020-10-30
Payer: MEDICAID

## 2020-10-30 PROCEDURE — 97162 PT EVAL MOD COMPLEX 30 MIN: CPT

## 2020-10-30 NOTE — PROGRESS NOTES
[address] [address] PHYSICAL THERAPY  [address] [place] [address], [place] [address],[facility], [facility] - Phone: [phone]  Fax: [phone] / [address] [facility]  Patient Name: Chirag Gonzalez : 1985   Medical   Diagnosis: Lymphedema of lower extremity [I89.0] Treatment Diagnosis: Lymphedema of lower extremity [I89.0]   Onset Date: Chronic     Referral Source: Cecilio Johnson * Start of Care [facility]): 10/30/2020   Prior Hospitalization: See medical history Provider #: [id number]   Prior Level of Function: I with ADL's, sedentary   Comorbidities: May Thurner Syndrome, Hx of DVT,    Medications: Verified on Patient Summary List   The Plan of Care and following information is based on the information from the initial evaluation.   ==================================================================================  Assessment / key information:  Chirag Gonzalez is a 37 y.o.  yo female with Dx: Lymphedema of lower extremity [I89.0]. Pt was dx with May Thurner syndrome and reports L iliac stent placement in . Pt reports recent increase in swelling, pain, and discoloration of the LE's in the last several months. Pt reports family history of swelling in her grandfather. Pt reports current symptoms include: pain, swelling, discoloration, and difficulty with adl's. Pt reports pain ranges 0-5/10 located int he BL LE's. Past medical history includes: Hx of back pain, Hx of DVT, depression. Functional limitations include: difficulty with standing, walking, stairs, recreational activities. Previous treatment for lymphedema includes: compression hose, elevation. Pt reports no red flags or contraindications to treatment at this time. Pt reports most recent scan in march revealed no clots or PE's and pt is currently no taking any blood thinners.   FOTO score = 50 (an established functional score where 100 = no disability)  Current Exam findings: Posture: WNL, Gait WNL, AROM: grossly WNL in all directions, Gross Strength: grossly 4+/5 in BL LE's, Skin Integrity: mild dry texture, pitting, purple reddish color, Sensation: intact to light touch. Circumferential Measurements are listed below:   Left Right   MTP 24 24   Navicular 26 25   Ankle 27 26   Calf 52 51   Knee 45 44   Thigh  84 83     The patient was instructed in a home exercise program to address the above findings/deficits. Pt will benefit from PT interventions to address the aforementioned deficits and allow pt to return to OF. Pt's therapy may include: manual lymphatic drainage, compression wrapping,  decongestive exercises, and patient education on skin care, signs and symptoms of infection, and self-management upon DC.      ==================================================================================  Eval Complexity: History MEDIUM  Complexity : 1-2 comorbidities / personal factors will impact the outcome/ POC ;  Examination  HIGH Complexity : 4+ Standardized tests and measures addressing body structure, function, activity limitation and / or participation in recreation ; Presentation MEDIUM Complexity : Evolving with changing characteristics ;   Decision Making MEDIUM Complexity : FOTO score of 26-74; Overall Complexity MEDIUM  Problem List: pain affecting function, edema affecting function, decrease ADL/ functional abilitiies, decrease activity tolerance, decrease flexibility/ joint mobility and decrease transfer abilities   Treatment Plan may include any combination of the following: Therapeutic exercise, Therapeutic activities, Neuromuscular re-education, Physical agent/modality, Gait/balance training, Manual therapy, Aquatic therapy, Patient education, Self Care training, Functional mobility training, Home safety training and Stair training  Patient / Family readiness to learn indicated by: asking questions, trying to perform skills and interest  Persons(s) to be included in education: patient (P)   Barriers to Learning/Limitations: None  Measures taken, if barriers to learning:    Patient Goal (s): Improve swelling, learn self management   Patient self reported health status: poor  Rehabilitation Potential: good  Goals:    Short Term Goals: To be accomplished in 6 treatments  1) Pt will be Independent with skin care routine to decrease risk of infection. 2) Pt will be Independent in don/doff/care of/wearing schedule of light compression. 3) Pt will verbalize with 100% accuracy which signs and symptoms to report immediately to physician concerning their condition. Long Term Goals: To be accomplished in 12 treatments  1) Pt will be Independent with compression management routine consisting of skin care, decongestive exercises, self-manual lymphatic stimulation techniques, strengthening and motion exercises, and don/doff/care of appropriate compression garment(s). 2) Patient will demonstrate decongestion of limb by 3% to improve functional ADL's      Frequency / Duration:   Patient to be seen  2  times per week for 8-12  treatments:  Patient / Caregiver education and instruction: exercises  G-Codes (GP): NA  Therapist Signature: Karley Samaniego PT, DPT   Date: 95/83/6185   Certification Period: NA Time: 7:44 AM   ==================================================================================  I certify that the above Physical Therapy Services are being furnished while the patient is under my care. I agree with the treatment plan and certify that this therapy is necessary. Physician Signature:        Date:       Time:     Please sign and return to [facility] at [facility]. or you may fax the signed copy to [phone]. Thank you.

## 2020-10-30 NOTE — PROGRESS NOTES
PHYSICAL THERAPY - INITIAL EVALUATION & TREATMENT NOTE    Patient Name: Ella French        Date: 10/30/2020  : 1985   yes Patient  Verified  Visit #:   1     Insurance: Payor: Ferny Tanmay / Plan: [address] [id number] / Product Type: Managed Care Medicaid /      In time: 900 Out time: 945   Total Treatment Time: 45     Medicare/BCBS Time Tracking (below)   Total Timed Codes (min):  NA 1:1 Treatment Time:  NA     TREATMENT AREA =  Lymphedema of lower extremity [I89.0]    SUBJECTIVE  Pain Level (on 0 to 10 scale):  5  / 10   Medication Changes/New allergies or changes in medical history, any new surgeries or procedures?    no  If yes, update Summary List   Subjective Functional Status/Changes:  []  No changes reported     SEE POC         OBJECTIVE    45 min Evaluation- SEE POC     ND min Manual Therapy:    Rationale: decreased edema to improve the patients ability to perform functional ADL's  The manual therapy interventions were performed at a separate and distinct time from the therapeutic activities interventions. ND min Therapeutic Activity: Pt educated on:  s/s of lymphedema, signs of infection/red flags, importance of proper skin care, decongestive exercises. Pt given Lymphedema Packet. Rationale:  To increased ROM, strength, decreased edema to improve the patients ability to perform functional ADL's    Billed With/As:   [] TE   [x] TA   [] Neuro   [] Self Care Patient Education: [x] Review HEP     [x] other: PATIENT GIVEN LYMPHEDEMA INFORMATION PACKET     Other Objective/Functional Measures:    SEE POC     Post Treatment Pain Level (on 0 to 10) scale:   5  / 10     ASSESSMENT  Assessment/Changes in Function:        Evaluation Code Complexity: History MEDIUM  Complexity : 1-2 comorbidities / personal factors will impact the outcome/ POC ; Examination HIGH Complexity : 4+ Standardized tests and measures addressing body structure, function, activity limitation and / or participation in recreation ; Presentation MEDIUM Complexity : Evolving with changing characteristics ; Decision Making MEDIUM Complexity : FOTO score of 26-74; Complexity MEDIUM    Justification for Eval Code Complexity:  Patient History : DVT, derpession  Examination SEE ABOVE EXAM  Clinical Presentation: evolving edema  Clinical Decision Making : FOTO 48         []  See Progress Note/Recertification   Patient will continue to benefit from skilled PT services to modify and progress therapeutic interventions, address functional mobility deficits, address ROM deficits, address strength deficits, analyze and address soft tissue restrictions and address lymphedema to attain remaining goals. Progress toward goals / Updated goals:    SEE POC     PLAN  []  Upgrade activities as tolerated yes Continue plan of care   []  Discharge due to :    [x]  Other: Pt will be seen 2 times per week for 12 sessions.       Therapist: Kev Roque, PT, DPT    Date: 10/30/2020 Time: 7:44 AM     Future Appointments   Date Time Provider Valeriy Chakraborty   10/30/2020  9:00 AM Wilma Simons, PT [place] [id number]

## 2020-11-03 ENCOUNTER — HOSPITAL ENCOUNTER (OUTPATIENT)
Dept: PHYSICAL THERAPY | Age: 35
Discharge: HOME | End: 2020-11-03
Payer: MEDICAID

## 2020-11-03 PROCEDURE — 97535 SELF CARE MNGMENT TRAINING: CPT

## 2020-11-03 PROCEDURE — 97530 THERAPEUTIC ACTIVITIES: CPT

## 2020-11-03 PROCEDURE — 97124 MASSAGE THERAPY: CPT

## 2020-11-03 NOTE — PROGRESS NOTES
PHYSICAL THERAPY - DAILY TREATMENT NOTE    Patient Name: Ella French        Date: 11/3/2020  : 1985   yes Patient  Verified  Visit #:   2   of     Insurance: Payor: Kush Torrez / Plan: 1 [facility] / Product Type: Managed Care Medicaid /      In time: 500 Out time: 545   Total Treatment Time: 45     Medicare/BCBS Time Tracking (below)   Total Timed Codes (min):  na 1:1 Treatment Time:  na     TREATMENT AREA =  Lymphedema of lower extremity [I89.0]    SUBJECTIVE  Pain Level (on 0 to 10 scale):  0  / 10   Medication Changes/New allergies or changes in medical history, any new surgeries or procedures?    no  If yes, update Summary List   Subjective Functional Status/Changes:  []  No changes reported     Pt reports no changes in symptoms or pain following IE last week          OBJECTIVE    15 min Massage Therapy MLD: BL LE's in long sitting   Rationale:      decrease edema  to improve patient's ability to perform functional ADL's  The manual therapy interventions were performed at a separate and distinct time from the therapeutic activities interventions.     15 min Therapeutic Activity: Education on functional exercises for decongestive muscle pump of the LE's   Rationale:    Decreased Lymphedema  to improve the patients ability to perform functional ADL's      15 min Self Care Education on proper performance of self MLD with handout given   Rationale:    Decreased Lymphedema  to improve the patients ability to perform functional ADL's      Billed With/As:   [] TE   [] TA   [x] Manual   [] Self Care Patient Education: [x] Review HEP    [] Progressed/Changed HEP based on:   [] positioning   [] body mechanics   [] transfers   [] heat/ice application    [] other: Discussed with and educated patient on importance of decongestive exercises during manual therapy     Other Objective/Functional Measures:    -Initiated MLD treatment today to the BL LE's today    - Handout given to patient about self MLD   Post Treatment Pain Level (on 0 to 10) scale:   0  / 10     ASSESSMENT  Assessment/Changes in Function:     Tolerance to treatment: no adverse reaction to treatment. Patient Education: self mld with handout given       []  See Progress Note/Recertification   Patient will continue to benefit from skilled PT services to modify and progress therapeutic interventions, address functional mobility deficits, address ROM deficits, address strength deficits, analyze and address soft tissue restrictions and address Lymphedema to attain remaining goals.    Progress toward goals / Updated goals:    Progress towards STG's: first visit since IE  Progress towards LTG's: first visit since IE     PLAN  []  Upgrade activities as tolerated yes Continue plan of care   []  Discharge due to :    []  Other:      Therapist: Fredi Emery PT, DPT    Date: 11/3/2020 Time: 2:24 PM     Future Appointments   Date Time Provider Valeriy Chakraborty   11/3/2020  5:00 PM Eve Simons [facility]   11/5/2020  6:00 PM Bobo Simons, PT [address]   11/10/2020  6:00 PM Bobo Simons, PT [address]   11/12/2020  6:00 PM Bobo Simons, PT [address]   11/17/2020  6:00 PM Bobo Simons, PT [address]   11/19/2020  6:00 PM Bobo Simons, PT [address]   11/24/2020  6:00 PM Bobo Simons, PT [address]

## 2020-11-10 ENCOUNTER — HOSPITAL ENCOUNTER (OUTPATIENT)
Dept: PHYSICAL THERAPY | Age: 35
Discharge: HOME | End: 2020-11-10
Payer: MEDICAID

## 2020-11-10 PROCEDURE — 97140 MANUAL THERAPY 1/> REGIONS: CPT

## 2020-11-10 NOTE — PROGRESS NOTES
PHYSICAL THERAPY - DAILY TREATMENT NOTE    Patient Name: Yoni Alvares        Date: 11/10/2020  : 1985   yes Patient  Verified  Visit #:   3     Insurance: Payor: Israel Salcedo / Plan: 1 [facility] 270 / Product Type: Managed Care Medicaid /      In time: 600 Out time: 640   Total Treatment Time: 40     Medicare/BCBS Time Tracking (below)   Total Timed Codes (min):  na 1:1 Treatment Time:  na     TREATMENT AREA =  Lymphedema of lower extremity [I89.0]    SUBJECTIVE  Pain Level (on 0 to 10 scale):  0  / 10   Medication Changes/New allergies or changes in medical history, any new surgeries or procedures?    no  If yes, update Summary List   Subjective Functional Status/Changes:  []  No changes reported     Pt reports doing self MLD everyday since last visit         OBJECTIVE    15 min Massage Therapy MLD: BL LE's in long sitting   Rationale:      decrease edema  to improve patient's ability to perform functional ADL's  The manual therapy interventions were performed at a separate and distinct time from the therapeutic activities interventions.     15 min Therapeutic Activity: Education on functional exercises for decongestive muscle pump of the LE's   Rationale:    Decreased Lymphedema  to improve the patients ability to perform functional ADL's      15 min Self Care Education on proper performance of self MLD with handout given   Rationale:    Decreased Lymphedema  to improve the patients ability to perform functional ADL's      Billed With/As:   [] TE   [] TA   [x] Manual   [] Self Care Patient Education: [x] Review HEP    [] Progressed/Changed HEP based on:   [] positioning   [] body mechanics   [] transfers   [] heat/ice application    [] other: Discussed with and educated patient on importance of decongestive exercises during manual therapy     Other Objective/Functional Measures:    Mild increase in redness of the dorsum of the foot   Post Treatment Pain Level (on 0 to 10) scale:   0  / 10     ASSESSMENT  Assessment/Changes in Function:     Tolerance to treatment: no adverse reaction to treatment. Patient Education: self mld with handout given       []  See Progress Note/Recertification   Patient will continue to benefit from skilled PT services to modify and progress therapeutic interventions, address functional mobility deficits, address ROM deficits, address strength deficits, analyze and address soft tissue restrictions and address Lymphedema to attain remaining goals.    Progress toward goals / Updated goals:    No changes towards goals since last session     PLAN  []  Upgrade activities as tolerated yes Continue plan of care   []  Discharge due to :    []  Other:      Therapist: Charli Gallegos, PT, DPT    Date: 11/10/2020 Time: 2:24 PM     Future Appointments   Date Time Provider Valeriy Chakraborty   11/12/2020  6:00 PM Shanae [address]   11/17/2020  6:00 PM Lois Simons, PT [address]   11/19/2020  6:00 PM Lois Simons, PT [address]   11/24/2020  6:00 PM Lois Simons, PT [address]

## 2020-11-12 ENCOUNTER — HOSPITAL ENCOUNTER (OUTPATIENT)
Dept: PHYSICAL THERAPY | Age: 35
Discharge: HOME | End: 2020-11-12
Payer: MEDICAID

## 2020-11-12 PROCEDURE — 97124 MASSAGE THERAPY: CPT

## 2020-11-12 PROCEDURE — 97535 SELF CARE MNGMENT TRAINING: CPT

## 2020-11-12 PROCEDURE — 97530 THERAPEUTIC ACTIVITIES: CPT

## 2020-11-12 NOTE — PROGRESS NOTES
PHYSICAL THERAPY - DAILY TREATMENT NOTE    Patient Name: Suresh Jaffe        Date: 2020  : 1985   yes Patient  Verified  Visit #:   3   of     Insurance: Payor: 100 [place],9D / Plan: 1 [facility] 270 / Product Type: Managed Care Medicaid /      In time: 600 Out time: 640   Total Treatment Time: 40     Medicare/BCBS Time Tracking (below)   Total Timed Codes (min):  na 1:1 Treatment Time:  na     TREATMENT AREA =  Lymphedema of lower extremity [I89.0]    SUBJECTIVE  Pain Level (on 0 to 10 scale):  0  / 10   Medication Changes/New allergies or changes in medical history, any new surgeries or procedures?    no  If yes, update Summary List   Subjective Functional Status/Changes:  []  No changes reported     Pt reports mild soreness in the hips from the LE exercises since last session. OBJECTIVE    22 min Massage Therapy MLD: BL LE's in long sitting   Rationale:      decrease edema  to improve patient's ability to perform functional ADL's  The manual therapy interventions were performed at a separate and distinct time from the therapeutic activities interventions.     8 min Self Care Education on proper skin care   Rationale:    Decreased Lymphedema  to improve the patients ability to perform functional ADL's    10 min Therapeutic Activity Practice on LE MLD with supervision from PT   Rationale:    Decreased Lymphedema  to improve the patients ability to perform functional ADL's    Billed With/As:   [] TE   [] TA   [x] Manual   [] Self Care Patient Education: [x] Review HEP    [] Progressed/Changed HEP based on:   [] positioning   [] body mechanics   [] transfers   [] heat/ice application    [] other: Discussed with and educated patient on importance of decongestive exercises during manual therapy     Other Objective/Functional Measures:    No changes since last session   Post Treatment Pain Level (on 0 to 10) scale:   0  / 10 ASSESSMENT  Assessment/Changes in Function:     Will trial KT tape NV to assess for allergic reaction. []  See Progress Note/Recertification   Patient will continue to benefit from skilled PT services to modify and progress therapeutic interventions, address functional mobility deficits, address ROM deficits, address strength deficits, analyze and address soft tissue restrictions and address Lymphedema to attain remaining goals.    Progress toward goals / Updated goals:    No changes towards goals since last session     PLAN  []  Upgrade activities as tolerated yes Continue plan of care   []  Discharge due to :    []  Other:      Therapist: Giuseppe Stauffer PT, DPT    Date: 11/12/2020 Time: 2:24 PM     Future Appointments   Date Time Provider Valeriy Chakraborty   11/12/2020  6:00 PM Radha Simons [id number]   11/17/2020  6:00 PM Rc Simons PT [address]   11/19/2020  6:00 PM Rc Simons PT [address]   11/24/2020  6:00 PM Rc Simons PT [address]

## 2020-11-17 ENCOUNTER — HOSPITAL ENCOUNTER (OUTPATIENT)
Dept: PHYSICAL THERAPY | Age: 35
Discharge: HOME | End: 2020-11-17
Payer: MEDICAID

## 2020-11-17 PROCEDURE — 97535 SELF CARE MNGMENT TRAINING: CPT

## 2020-11-17 PROCEDURE — 97124 MASSAGE THERAPY: CPT

## 2020-11-17 NOTE — PROGRESS NOTES
PHYSICAL THERAPY - DAILY TREATMENT NOTE    Patient Name: Chirag Gonzalez        Date: 2020  : 1985   yes Patient  Verified  Visit #:   4   of     Insurance: Payor: 100 [place],9D / Plan: 1 [facility] 270 / Product Type: Managed Care Medicaid /      In time: 600 Out time: 630   Total Treatment Time: 30     Medicare/BCBS Time Tracking (below)   Total Timed Codes (min):  na 1:1 Treatment Time:  na     TREATMENT AREA =  Lymphedema of lower extremity [I89.0]    SUBJECTIVE  Pain Level (on 0 to 10 scale):  0  / 10   Medication Changes/New allergies or changes in medical history, any new surgeries or procedures?    no  If yes, update Summary List   Subjective Functional Status/Changes:  []  No changes reported   Pt reports no changes in the legs         OBJECTIVE    22 min Massage Therapy MLD: BL LE's in long sitting   Rationale:      decrease edema  to improve patient's ability to perform functional ADL's  The manual therapy interventions were performed at a separate and distinct time from the therapeutic activities interventions.     8 min Self Care Education on compression hose ordering with handout given   Rationale:    Decreased Lymphedema  to improve the patients ability to perform functional ADL's      Billed With/As:   [] TE   [] TA   [x] Manual   [] Self Care Patient Education: [x] Review HEP    [] Progressed/Changed HEP based on:   [] positioning   [] body mechanics   [] transfers   [] heat/ice application    [] other: Discussed with and educated patient on importance of decongestive exercises during manual therapy     Other Objective/Functional Measures:    Pt and PT spent time discussing compression hose ordering with handout given to patient   Post Treatment Pain Level (on 0 to 10) scale:   0  / 10     ASSESSMENT  Assessment/Changes in Function:     Pt will be seen in 1 week due to hold period for ordering compression socks       []  See Progress Note/Recertification   Patient will continue to benefit from skilled PT services to modify and progress therapeutic interventions, address functional mobility deficits, address ROM deficits, address strength deficits, analyze and address soft tissue restrictions and address Lymphedema to attain remaining goals.    Progress toward goals / Updated goals:    No changes towards goals since last session     PLAN  []  Upgrade activities as tolerated yes Continue plan of care   []  Discharge due to :    []  Other:      Therapist: Lina Alexander PT, DPT    Date: 11/17/2020 Time: 2:24 PM     Future Appointments   Date Time Provider Valeriy Chakraborty   11/17/2020  6:00 PM Darshana Simons [address]   11/19/2020  6:00 PM Logan Simons PT [address]   11/24/2020  6:00 PM Logan Simons PT [address]

## 2020-11-19 ENCOUNTER — APPOINTMENT (OUTPATIENT)
Dept: PHYSICAL THERAPY | Age: 35
End: 2020-11-19
Payer: MEDICAID

## 2020-11-24 ENCOUNTER — HOSPITAL ENCOUNTER (OUTPATIENT)
Dept: PHYSICAL THERAPY | Age: 35
Discharge: HOME | End: 2020-11-24
Payer: MEDICAID

## 2020-11-24 PROCEDURE — 97535 SELF CARE MNGMENT TRAINING: CPT

## 2020-11-24 PROCEDURE — 97124 MASSAGE THERAPY: CPT

## 2020-12-10 ENCOUNTER — HOSPITAL ENCOUNTER (OUTPATIENT)
Dept: PHYSICAL THERAPY | Age: 35
Discharge: HOME | End: 2020-12-10
Payer: MEDICAID

## 2020-12-10 PROCEDURE — 97124 MASSAGE THERAPY: CPT

## 2020-12-10 PROCEDURE — 97535 SELF CARE MNGMENT TRAINING: CPT

## 2020-12-10 NOTE — PROGRESS NOTES
PHYSICAL THERAPY - DAILY TREATMENT NOTE    Patient Name: Spring Boone        Date: 12/10/2020  : 1985   yes Patient  Verified  Visit #:   6     Insurance: Payor: [facility] / Plan: 1 [facility] 270 / Product Type: Managed Care Medicaid /      In time: 600 Out time: 630   Total Treatment Time: 30     Medicare/BCBS Time Tracking (below)   Total Timed Codes (min):  na 1:1 Treatment Time:  na     TREATMENT AREA =  Lymphedema of lower extremity [I89.0]    SUBJECTIVE  Pain Level (on 0 to 10 scale):  0/ 10   Medication Changes/New allergies or changes in medical history, any new surgeries or procedures?    no  If yes, update Summary List   Subjective Functional Status/Changes:  []  No changes reported     SEE NOTE       OBJECTIVE    15 min Massage Therapy MLD: BL LE's in long sitting   Rationale:      decrease edema  to improve patient's ability to perform functional ADL's  The manual therapy interventions were performed at a separate and distinct time from the therapeutic activities interventions.     15 min Self Care Education on DC instructions   Rationale:    Decreased Lymphedema  to improve the patients ability to perform functional ADL's      Billed With/As:   [] TE   [] TA   [x] Manual   [] Self Care Patient Education: [x] Review HEP    [] Progressed/Changed HEP based on:   [] positioning   [] body mechanics   [] transfers   [] heat/ice application    [] other: Discussed with and educated patient on importance of decongestive exercises during manual therapy     Other Objective/Functional Measures:    SEE NOTE   Post Treatment Pain Level (on 0 to 10) scale:   0  / 10     ASSESSMENT  Assessment/Changes in Function:     SEE NOTE   []  See Progress Note/Recertification      Progress toward goals / Updated goals:    SEE NOTE     PLAN  []  Upgrade activities as tolerated no Continue plan of care   [x]  Discharge due to : Progressing towards all goals and self managament   []  Other:      Therapist: Edi Ellis, PT, DPT    Date: 12/10/2020 Time: 2:24 PM     Future Appointments   Date Time Provider Valeriy Chakraborty   12/10/2020  6:00 PM Waldo Simons, PT [id number]

## 2020-12-10 NOTE — PROGRESS NOTES
[facility] PHYSICAL THERAPY   [facility] [address],[place], [address] [facility] - Phone: [phone]  Fax: [phone]  DISCHARGE SUMMARY FOR PHYSICAL THERAPY          Patient Name: Chirag Gonzalez : 1985   Treatment/Medical Diagnosis: Lymphedema of lower extremity [I89.0]   Onset Date: chronic    Referral Source: Cecilio Johnson * Start of Care [facility] [place]): 10/30/2020   Prior Hospitalization: See Medical History Provider #: [id number]   Prior Level of Function: I with ADL's, sedentary   Comorbidities: May Thurner Syndrome, Hx of DVT,    Medications: Verified on Patient Summary List   Visits from [facility]: 6 Missed Visits: 0     Goals:  Goals:    Short Term Goals: To be accomplished in 6 treatments  1) Pt will be Independent with skin care routine to decrease risk of infection.- goal met  2) Pt will be Independent in don/doff/care of/wearing schedule of light compression.- goal met  3) Pt will verbalize with 100% accuracy which signs and symptoms to report immediately to physician concerning their condition.- goal met        Long Term Goals: To be accomplished in 12 treatments  1) Pt will be Independent with compression management routine consisting of skin care, decongestive exercises, self-manual lymphatic stimulation techniques, strengthening and motion exercises, and don/doff/care of appropriate compression garment(s). - goal met  2) Patient will demonstrate decongestion of limb by 3% to improve functional ADL's- goal progressed but not met       Measurements:   Left Right   MTP 24 24   Navicular 26 25   Ankle 27 26   Calf 52 51   Knee 45 44   Thigh  84 83      Left Right   MTP 24 24   Navicular 26 25   Ankle 27 26   Calf 51 50   Knee 44 43   Thigh  82 81         Key Functional Changes/Progress:  Pt has been seen for 6 visits since IE on 10/30/2020.  Pt's therapy has included: manual lymphatic drainage,  as well as patient education on self-management including: decongestive exercises, self-massage, positioning including elevation, appropriate wear of compression, as well as proper skin care and signs/symptoms of infection. Measurements are listed above. Pt reports max pain 0/10 at this time, with average pain 0/10. Pt reports good compliance with HEP at this time which includes decongestive exercises, self-massage, and positioning/elevation. Pt will be DC from therapy at this time with instructions on self management of symptoms. Assessments/Recommendations: Discontinue therapy. Progressing towards or have reached established goals. If you have any questions/comments please contact us directly at [phone]. Thank you for allowing us to assist in the care of your patient. Therapist Signature: Kennedy Champagne PT, DPT   Date: 12/10/2020   Reporting Period: NA Time: 1:05 PM       NOTE TO PHYSICIAN:  Your patient's insurance requires this discharge note be signed and returned. PLEASE COMPLETE THE ORDERS BELOW AND RETURN TO:  [facility] [place] PHYSICAL THERAPY    ___ I have read the above report and request that my patient be discharged from therapy.      Physician Signature:        Date:       Time:

## 2022-03-18 PROBLEM — I45.10 RBBB: Status: ACTIVE | Noted: 2017-10-12

## 2022-03-18 PROBLEM — I87.2 VENOUS REFLUX: Status: ACTIVE | Noted: 2017-10-12

## 2022-03-19 PROBLEM — E66.01 CLASS 3 SEVERE OBESITY DUE TO EXCESS CALORIES WITHOUT SERIOUS COMORBIDITY WITH BODY MASS INDEX (BMI) OF 40.0 TO 44.9 IN ADULT (HCC): Status: ACTIVE | Noted: 2019-10-25

## 2023-05-22 RX ORDER — LORAZEPAM 1 MG/1
TABLET ORAL EVERY 4 HOURS PRN
COMMUNITY

## 2023-05-22 RX ORDER — DESVENLAFAXINE SUCCINATE 50 MG/1
50 TABLET, EXTENDED RELEASE ORAL
COMMUNITY